# Patient Record
Sex: FEMALE | Race: OTHER | NOT HISPANIC OR LATINO | URBAN - METROPOLITAN AREA
[De-identification: names, ages, dates, MRNs, and addresses within clinical notes are randomized per-mention and may not be internally consistent; named-entity substitution may affect disease eponyms.]

---

## 2022-11-16 ENCOUNTER — OUTPATIENT (OUTPATIENT)
Dept: OUTPATIENT SERVICES | Facility: HOSPITAL | Age: 62
LOS: 1 days | End: 2022-11-16

## 2022-11-16 VITALS
RESPIRATION RATE: 18 BRPM | TEMPERATURE: 98 F | WEIGHT: 186.07 LBS | HEART RATE: 70 BPM | HEIGHT: 66 IN | DIASTOLIC BLOOD PRESSURE: 81 MMHG | OXYGEN SATURATION: 98 % | SYSTOLIC BLOOD PRESSURE: 127 MMHG

## 2022-11-16 DIAGNOSIS — I10 ESSENTIAL (PRIMARY) HYPERTENSION: ICD-10-CM

## 2022-11-16 DIAGNOSIS — E05.90 THYROTOXICOSIS, UNSPECIFIED WITHOUT THYROTOXIC CRISIS OR STORM: ICD-10-CM

## 2022-11-16 DIAGNOSIS — Z01.818 ENCOUNTER FOR OTHER PREPROCEDURAL EXAMINATION: ICD-10-CM

## 2022-11-16 DIAGNOSIS — M17.0 BILATERAL PRIMARY OSTEOARTHRITIS OF KNEE: ICD-10-CM

## 2022-11-16 DIAGNOSIS — R73.03 PREDIABETES: ICD-10-CM

## 2022-11-16 DIAGNOSIS — Z98.890 OTHER SPECIFIED POSTPROCEDURAL STATES: Chronic | ICD-10-CM

## 2022-11-16 DIAGNOSIS — N18.9 CHRONIC KIDNEY DISEASE, UNSPECIFIED: ICD-10-CM

## 2022-11-16 LAB
ALBUMIN SERPL ELPH-MCNC: 3.2 G/DL — LOW (ref 3.5–5)
ALP SERPL-CCNC: 82 U/L — SIGNIFICANT CHANGE UP (ref 40–120)
ALT FLD-CCNC: 23 U/L DA — SIGNIFICANT CHANGE UP (ref 10–60)
ANION GAP SERPL CALC-SCNC: 6 MMOL/L — SIGNIFICANT CHANGE UP (ref 5–17)
APTT BLD: 27.5 SEC — SIGNIFICANT CHANGE UP (ref 27.5–35.5)
AST SERPL-CCNC: 8 U/L — LOW (ref 10–40)
BILIRUB SERPL-MCNC: 0.2 MG/DL — SIGNIFICANT CHANGE UP (ref 0.2–1.2)
BLD GP AB SCN SERPL QL: SIGNIFICANT CHANGE UP
BUN SERPL-MCNC: 36 MG/DL — HIGH (ref 7–18)
CALCIUM SERPL-MCNC: 9.3 MG/DL — SIGNIFICANT CHANGE UP (ref 8.4–10.5)
CHLORIDE SERPL-SCNC: 115 MMOL/L — HIGH (ref 96–108)
CO2 SERPL-SCNC: 24 MMOL/L — SIGNIFICANT CHANGE UP (ref 22–31)
CREAT SERPL-MCNC: 1.46 MG/DL — HIGH (ref 0.5–1.3)
EGFR: 40 ML/MIN/1.73M2 — LOW
GLUCOSE SERPL-MCNC: 132 MG/DL — HIGH (ref 70–99)
HCT VFR BLD CALC: 39.2 % — SIGNIFICANT CHANGE UP (ref 34.5–45)
HGB BLD-MCNC: 12.7 G/DL — SIGNIFICANT CHANGE UP (ref 11.5–15.5)
INR BLD: 0.98 RATIO — SIGNIFICANT CHANGE UP (ref 0.88–1.16)
MCHC RBC-ENTMCNC: 28.9 PG — SIGNIFICANT CHANGE UP (ref 27–34)
MCHC RBC-ENTMCNC: 32.4 GM/DL — SIGNIFICANT CHANGE UP (ref 32–36)
MCV RBC AUTO: 89.3 FL — SIGNIFICANT CHANGE UP (ref 80–100)
MRSA PCR RESULT.: SIGNIFICANT CHANGE UP
NRBC # BLD: 0 /100 WBCS — SIGNIFICANT CHANGE UP (ref 0–0)
PLATELET # BLD AUTO: 250 K/UL — SIGNIFICANT CHANGE UP (ref 150–400)
POTASSIUM SERPL-MCNC: 4.7 MMOL/L — SIGNIFICANT CHANGE UP (ref 3.5–5.3)
POTASSIUM SERPL-SCNC: 4.7 MMOL/L — SIGNIFICANT CHANGE UP (ref 3.5–5.3)
PROT SERPL-MCNC: 7.6 G/DL — SIGNIFICANT CHANGE UP (ref 6–8.3)
PROTHROM AB SERPL-ACNC: 11.6 SEC — SIGNIFICANT CHANGE UP (ref 10.5–13.4)
RBC # BLD: 4.39 M/UL — SIGNIFICANT CHANGE UP (ref 3.8–5.2)
RBC # FLD: 12 % — SIGNIFICANT CHANGE UP (ref 10.3–14.5)
S AUREUS DNA NOSE QL NAA+PROBE: SIGNIFICANT CHANGE UP
SARS-COV-2 RNA SPEC QL NAA+PROBE: SIGNIFICANT CHANGE UP
SODIUM SERPL-SCNC: 145 MMOL/L — SIGNIFICANT CHANGE UP (ref 135–145)
WBC # BLD: 10.11 K/UL — SIGNIFICANT CHANGE UP (ref 3.8–10.5)
WBC # FLD AUTO: 10.11 K/UL — SIGNIFICANT CHANGE UP (ref 3.8–10.5)

## 2022-11-16 RX ORDER — SODIUM CHLORIDE 9 MG/ML
3 INJECTION INTRAMUSCULAR; INTRAVENOUS; SUBCUTANEOUS EVERY 8 HOURS
Refills: 0 | Status: DISCONTINUED | OUTPATIENT
Start: 2022-11-18 | End: 2022-11-21

## 2022-11-16 NOTE — H&P PST ADULT - NSICDXFAMILYHX_GEN_ALL_CORE_FT
FAMILY HISTORY:  Father  Still living? No  Family history of diabetes mellitus, Age at diagnosis: Age Unknown    Mother  Still living? No  Family history of stomach cancer, Age at diagnosis: Age Unknown

## 2022-11-16 NOTE — H&P PST ADULT - ASSESSMENT
This is a 61 yr old Ch[place] female with PMH of HTN, CKD-last EGFR 37 on 10/26/2022, COVID-19 viral infection in April 2020, PSH of re who presents with bilateral primary osteoarthritis of knee. Pt is scheduled for right total knee replacement on 11/18/2022.  YVONNE stop bang score 4. Pt denies history of YVONNE, never did sleep study.  This is a 61 yr old Dominican female with PMH of HTN, prediabetes-last Hemoglobin A1C 6.0 on 10/26/2022, CKD-last EGFR 37 on 10/26/2022, subclinical hyperthyroidism-last TSH 0.28, T4 0.88 on 10/26/2022-not on any medication-PCP to follow-up after surgery given the emergence of the surgery, COVID-19 viral infection in April 2020 who presents with bilateral primary osteoarthritis of knee. Pt is scheduled for right total knee replacement on 11/18/2022.  YVONNE stop bang score 4. Pt denies history of YVONNE, never did sleep study.

## 2022-11-16 NOTE — H&P PST ADULT - FALL HARM RISK - HARM RISK INTERVENTIONS
Assistance with ambulation/Assistance OOB with selected safe patient handling equipment/Communicate Risk of Fall with Harm to all staff/Discuss with provider need for PT consult/Monitor gait and stability/Provide patient with walking aids - walker, cane, crutches/Reinforce activity limits and safety measures with patient and family/Sit up slowly, dangle for a short time, stand at bedside before walking/Tailored Fall Risk Interventions/Use of alarms - bed, chair and/or voice tab/Visual Cue: Yellow wristband and red socks/Bed in lowest position, wheels locked, appropriate side rails in place/Call bell, personal items and telephone in reach/Instruct patient to call for assistance before getting out of bed or chair/Non-slip footwear when patient is out of bed/[place] to call system/Physically safe environment - no spills, clutter or unnecessary equipment/Purposeful Proactive Rounding/Room/bathroom lighting operational, light cord in reach

## 2022-11-16 NOTE — H&P PST ADULT - PROBLEM SELECTOR PLAN 5
Last TSH 0.28, T4 0.88 on 10/26/2022-not on any medication.   As per PCP Dr Serina Lala she will follow-up after surgery given the emergence of the surgery.

## 2022-11-16 NOTE — H&P PST ADULT - NSICDXPASTSURGICALHX_GEN_ALL_CORE_FT
PAST SURGICAL HISTORY:  History of nephrolithotomy with removal of calculi bilateral in 1994 and 1995

## 2022-11-16 NOTE — CHART NOTE - NSCHARTNOTEFT_GEN_A_CORE
PRE-TJR SOCIAL/FUNCTIONAL SCREENING Via: In Person Meet  after PST on 11/16/22 with son and int ID [id number]                                                                                                                                  Preferred Language: Ginny   Patient Telephone #:[phone]  EMAIL ADDRESS:[email or web address]        Pt stated Goal for Surgery : " To alleviate pain in my knee."     Sx Date:  11/18/2022                                                                                       Surgery Type:  Right  Knee Replacement     Surgeon Dr Carina MENDEZ Status: Pt. is Fully Vaccinated  // COVID test scheduled     SOCIAL HISTORY:     Live With children  in a    Apartment     Stairs Required to Access (Street to Front Door) Residence:   None     Once Inside Pt Residence:   To Access a Bathroom:      No Stairs Required     To Access a Bedroom Where Pt. Can Sleep:  No Stairs Required       Pt. Currently Has Formal Home Health Services:  No      MOBILITY HISTORY:   Baseline Ambulation- Pt is Independent in ambulation     Pt. Owns Any Other Medical Equipment?  No   MEDICAL HISTORY:  Pt has any History of Back Surgery:   No    Pt has any Allergies:  No    Pt denies Hx of Sleep Apnea   Pt denies use of C/BIPAP     Pt. Pharmacy Information:  Name:  [phone]                 Address:  [address] [facility] [address]        Telephone #:     Pt. will Require Transportation to Home Upon Discharge: No - son will  the patient after surgery     For Post-Acute Care:  Pt. prefers  [facility] for post acute needs   Pt was provided with pre- op education book "What to do before and after knee  replacement " and referred to it during Pre-op education. All questions were answered , pt. has my phone number for follow up as needed.

## 2022-11-16 NOTE — H&P PST ADULT - NSICDXPASTMEDICALHX_GEN_ALL_CORE_FT
PAST MEDICAL HISTORY:  HTN (hypertension)     Prediabetes     Primary osteoarthritis of both knees      PAST MEDICAL HISTORY:  CKD (chronic kidney disease)     HTN (hypertension)     Prediabetes     Primary osteoarthritis of both knees      PAST MEDICAL HISTORY:  CKD (chronic kidney disease)     COVID-19 virus infection 4/2021    HTN (hypertension)     Prediabetes     Primary osteoarthritis of both knees     Subclinical hyperthyroidism

## 2022-11-16 NOTE — H&P PST ADULT - ATTENDING COMMENTS
Plan for right total knee  Risks discussed: infection, nerve injury, blood clot, etc...  She signed the consent. 7:45 am:  Plan for right total knee  Risks discussed: infection, nerve injury, blood clot, etc...  She signed the consent.    9:11 am:  Pt had head ach before entering the operating room.  Not able to control the BP well enough for surgery.  Surgery cancelled.  Will get medical consult for managing her BP.  Will reschedule surgery when BP is stabilized.

## 2022-11-16 NOTE — H&P PST ADULT - PROBLEM SELECTOR PLAN 4
Last Hemoglobin A1C 6.0 on 10/26/2022.  Perioperative glucose monitoring and coverage as needed.   Follow-up with PCP for diabetic management.

## 2022-11-16 NOTE — H&P PST ADULT - PROBLEM SELECTOR PLAN 3
h/o CKD-last EGFR 37 on 10/26/2022.  Pt instructed to avoid NSAIDs 1 week before surgery.  Advised to take Tylenol as needed for pain. Stated understanding of instructions given. h/o CKD-last EGFR 37 on 10/26/2022.  Nephrotoxic agents to be avoided perioperatively.  Pt instructed to avoid NSAIDs 1 week before surgery.  Advised to take Tylenol as needed for pain. Stated understanding of instructions given.

## 2022-11-16 NOTE — H&P PST ADULT - HISTORY OF PRESENT ILLNESS
This is a 61 yr old Mosotho female with PMH of HTN, CKD-last EGFR 37 on 10/26/2022, presents with c/o knee pain due to osteoarthritis.Pt reports worsening of pain with prolonged ambulation and standing.Recent X-Ray revealed loss of cartilage. Pt is scheduled for right total knee replacement on 11/18/2022. This is a 61 yr old [place] female with PMH of HTN, prediabetes-last Hemoglobin A1C 6.0 on 10/26/2022, CKD-last EGFR 37 on 10/26/2022, COVID-19 viral infection in April 2020 who presents with c/o bilateral knee pain due to osteoarthritis-right worse than left. Pt reports worsening of pain with prolonged ambulation. Pain has subsided in left knee after receiving steroid injections. Pt is scheduled for right total knee replacement on 11/18/2022. This is a 61 yr old Tajik female with PMH of HTN, prediabetes-last Hemoglobin A1C 6.0 on 10/26/2022, CKD-last EGFR 37 on 10/26/2022, subclinical hyperthyroidism-last TSH 0.28, T4 0.88 on 10/26/2022-not on any medication-PCP to follow-up after surgery given the emergence of the surgery, COVID-19 viral infection in April 2020 who presents with c/o bilateral knee pain due to osteoarthritis-right worse than left. Pt reports worsening of pain with prolonged ambulation. Pain has subsided in left knee after receiving steroid injections. Pt is scheduled for right total knee replacement on 11/18/2022.

## 2022-11-16 NOTE — H&P PST ADULT - PROBLEM SELECTOR PLAN 2
Instructed to continue Lisinopril and take it with sips of water on day of surgery.    Follow-up with PCP postop for management.

## 2022-11-16 NOTE — H&P PST ADULT - PROBLEM SELECTOR PLAN 1
Right total knee replacement on 11/18/2022.  Optimized for surgery by PCP.  YVONNE stop bang score 4. Pt denies history of YVONNE, never did sleep study.   COVID-19 PCR test performed in PST today.  Preoperative instructions discussed with pt via Yoruba speaking son and son and given to pt.   Instructed pt that she will need someone to escort her home after surgery, to notify security when she arrives in the lobby of the hospital that she is here for surgery, not to eat or drink anything after midnight the night before the surgery, to avoid NSAIDs such as Ibuprofen, Motrin, Aleve, Advil, Naproxen before surgery, to take Tylenol if needed for pain, to report if she has been exposed to any one with any contagious diseases including Covid-19 or if she is exhibiting any symptoms of COVID-19. Instructed about use of Chlorhexidine 4% soap for 3 days before surgery including the morning of the surgery to prevent infection. Verbalized understanding of instructions given.

## 2022-11-17 ENCOUNTER — OFFICE (OUTPATIENT)
Dept: URBAN - METROPOLITAN AREA CLINIC 90 | Facility: CLINIC | Age: 62
Setting detail: OPHTHALMOLOGY
End: 2022-11-17
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
## 2022-11-17 PROCEDURE — NO SHOW FE NO SHOW FEE: Performed by: OPHTHALMOLOGY

## 2022-11-18 ENCOUNTER — INPATIENT (INPATIENT)
Facility: HOSPITAL | Age: 62
LOS: 2 days | Discharge: HOME | DRG: 554 | End: 2022-11-21
Attending: INTERNAL MEDICINE | Admitting: INTERNAL MEDICINE
Payer: MEDICAID

## 2022-11-18 VITALS
WEIGHT: 186.07 LBS | RESPIRATION RATE: 16 BRPM | OXYGEN SATURATION: 97 % | SYSTOLIC BLOOD PRESSURE: 169 MMHG | HEIGHT: 66 IN | HEART RATE: 84 BPM | TEMPERATURE: 98 F | DIASTOLIC BLOOD PRESSURE: 102 MMHG

## 2022-11-18 DIAGNOSIS — I63.9 CEREBRAL INFARCTION, UNSPECIFIED: ICD-10-CM

## 2022-11-18 DIAGNOSIS — M17.0 BILATERAL PRIMARY OSTEOARTHRITIS OF KNEE: ICD-10-CM

## 2022-11-18 DIAGNOSIS — N17.9 ACUTE KIDNEY FAILURE, UNSPECIFIED: ICD-10-CM

## 2022-11-18 DIAGNOSIS — I16.0 HYPERTENSIVE URGENCY: ICD-10-CM

## 2022-11-18 DIAGNOSIS — Z98.890 OTHER SPECIFIED POSTPROCEDURAL STATES: Chronic | ICD-10-CM

## 2022-11-18 DIAGNOSIS — I16.1 HYPERTENSIVE EMERGENCY: ICD-10-CM

## 2022-11-18 PROBLEM — E05.90 THYROTOXICOSIS, UNSPECIFIED WITHOUT THYROTOXIC CRISIS OR STORM: Chronic | Status: ACTIVE | Noted: 2022-11-16

## 2022-11-18 PROBLEM — N18.9 CHRONIC KIDNEY DISEASE, UNSPECIFIED: Chronic | Status: ACTIVE | Noted: 2022-11-16

## 2022-11-18 PROBLEM — I10 ESSENTIAL (PRIMARY) HYPERTENSION: Chronic | Status: ACTIVE | Noted: 2022-11-16

## 2022-11-18 PROBLEM — U07.1 COVID-19: Chronic | Status: ACTIVE | Noted: 2022-11-16

## 2022-11-18 PROBLEM — R73.03 PREDIABETES: Chronic | Status: ACTIVE | Noted: 2022-11-16

## 2022-11-18 LAB
ALBUMIN SERPL ELPH-MCNC: 3.1 G/DL — LOW (ref 3.5–5)
ALP SERPL-CCNC: 73 U/L — SIGNIFICANT CHANGE UP (ref 40–120)
ALT FLD-CCNC: 20 U/L DA — SIGNIFICANT CHANGE UP (ref 10–60)
ANION GAP SERPL CALC-SCNC: 6 MMOL/L — SIGNIFICANT CHANGE UP (ref 5–17)
APPEARANCE UR: CLEAR — SIGNIFICANT CHANGE UP
APTT BLD: 28.9 SEC — SIGNIFICANT CHANGE UP (ref 27.5–35.5)
AST SERPL-CCNC: 11 U/L — SIGNIFICANT CHANGE UP (ref 10–40)
BACTERIA # UR AUTO: ABNORMAL /HPF
BASOPHILS # BLD AUTO: 0.05 K/UL — SIGNIFICANT CHANGE UP (ref 0–0.2)
BASOPHILS NFR BLD AUTO: 0.6 % — SIGNIFICANT CHANGE UP (ref 0–2)
BILIRUB SERPL-MCNC: 0.3 MG/DL — SIGNIFICANT CHANGE UP (ref 0.2–1.2)
BILIRUB UR-MCNC: NEGATIVE — SIGNIFICANT CHANGE UP
BLD GP AB SCN SERPL QL: SIGNIFICANT CHANGE UP
BUN SERPL-MCNC: 22 MG/DL — HIGH (ref 7–18)
CALCIUM SERPL-MCNC: 8.4 MG/DL — SIGNIFICANT CHANGE UP (ref 8.4–10.5)
CHLORIDE SERPL-SCNC: 110 MMOL/L — HIGH (ref 96–108)
CK MB BLD-MCNC: <5.6 % — HIGH (ref 0–3.5)
CK MB CFR SERPL CALC: <1 NG/ML — SIGNIFICANT CHANGE UP (ref 0–3.6)
CK SERPL-CCNC: 18 U/L — LOW (ref 21–215)
CO2 SERPL-SCNC: 27 MMOL/L — SIGNIFICANT CHANGE UP (ref 22–31)
COLOR SPEC: YELLOW — SIGNIFICANT CHANGE UP
CREAT ?TM UR-MCNC: 28 MG/DL — SIGNIFICANT CHANGE UP
CREAT SERPL-MCNC: 1.13 MG/DL — SIGNIFICANT CHANGE UP (ref 0.5–1.3)
DIFF PNL FLD: NEGATIVE — SIGNIFICANT CHANGE UP
EGFR: 55 ML/MIN/1.73M2 — LOW
EOSINOPHIL # BLD AUTO: 0.07 K/UL — SIGNIFICANT CHANGE UP (ref 0–0.5)
EOSINOPHIL NFR BLD AUTO: 0.8 % — SIGNIFICANT CHANGE UP (ref 0–6)
EPI CELLS # UR: SIGNIFICANT CHANGE UP /HPF
GLUCOSE BLDC GLUCOMTR-MCNC: 93 MG/DL — SIGNIFICANT CHANGE UP (ref 70–99)
GLUCOSE SERPL-MCNC: 126 MG/DL — HIGH (ref 70–99)
GLUCOSE UR QL: NEGATIVE — SIGNIFICANT CHANGE UP
HCT VFR BLD CALC: 37.7 % — SIGNIFICANT CHANGE UP (ref 34.5–45)
HGB BLD-MCNC: 12.1 G/DL — SIGNIFICANT CHANGE UP (ref 11.5–15.5)
IMM GRANULOCYTES NFR BLD AUTO: 0.4 % — SIGNIFICANT CHANGE UP (ref 0–0.9)
INR BLD: 1 RATIO — SIGNIFICANT CHANGE UP (ref 0.88–1.16)
KETONES UR-MCNC: NEGATIVE — SIGNIFICANT CHANGE UP
LEUKOCYTE ESTERASE UR-ACNC: NEGATIVE — SIGNIFICANT CHANGE UP
LYMPHOCYTES # BLD AUTO: 2.22 K/UL — SIGNIFICANT CHANGE UP (ref 1–3.3)
LYMPHOCYTES # BLD AUTO: 26.7 % — SIGNIFICANT CHANGE UP (ref 13–44)
MCHC RBC-ENTMCNC: 28.3 PG — SIGNIFICANT CHANGE UP (ref 27–34)
MCHC RBC-ENTMCNC: 32.1 GM/DL — SIGNIFICANT CHANGE UP (ref 32–36)
MCV RBC AUTO: 88.1 FL — SIGNIFICANT CHANGE UP (ref 80–100)
MONOCYTES # BLD AUTO: 0.47 K/UL — SIGNIFICANT CHANGE UP (ref 0–0.9)
MONOCYTES NFR BLD AUTO: 5.7 % — SIGNIFICANT CHANGE UP (ref 2–14)
NEUTROPHILS # BLD AUTO: 5.46 K/UL — SIGNIFICANT CHANGE UP (ref 1.8–7.4)
NEUTROPHILS NFR BLD AUTO: 65.8 % — SIGNIFICANT CHANGE UP (ref 43–77)
NITRITE UR-MCNC: NEGATIVE — SIGNIFICANT CHANGE UP
NRBC # BLD: 0 /100 WBCS — SIGNIFICANT CHANGE UP (ref 0–0)
OSMOLALITY UR: 468 MOS/KG — SIGNIFICANT CHANGE UP (ref 50–1200)
PH UR: 6 — SIGNIFICANT CHANGE UP (ref 5–8)
PLATELET # BLD AUTO: 232 K/UL — SIGNIFICANT CHANGE UP (ref 150–400)
POTASSIUM SERPL-MCNC: 3.8 MMOL/L — SIGNIFICANT CHANGE UP (ref 3.5–5.3)
POTASSIUM SERPL-SCNC: 3.8 MMOL/L — SIGNIFICANT CHANGE UP (ref 3.5–5.3)
PROT ?TM UR-MCNC: 11 MG/DL — SIGNIFICANT CHANGE UP (ref 0–12)
PROT SERPL-MCNC: 6.6 G/DL — SIGNIFICANT CHANGE UP (ref 6–8.3)
PROT UR-MCNC: 15
PROTHROM AB SERPL-ACNC: 11.9 SEC — SIGNIFICANT CHANGE UP (ref 10.5–13.4)
RBC # BLD: 4.28 M/UL — SIGNIFICANT CHANGE UP (ref 3.8–5.2)
RBC # FLD: 12.1 % — SIGNIFICANT CHANGE UP (ref 10.3–14.5)
RBC CASTS # UR COMP ASSIST: SIGNIFICANT CHANGE UP /HPF (ref 0–2)
SODIUM SERPL-SCNC: 143 MMOL/L — SIGNIFICANT CHANGE UP (ref 135–145)
SODIUM UR-SCNC: 134 MMOL/L — SIGNIFICANT CHANGE UP
SP GR SPEC: 1.01 — SIGNIFICANT CHANGE UP (ref 1.01–1.02)
TROPONIN I, HIGH SENSITIVITY RESULT: 6.7 NG/L — SIGNIFICANT CHANGE UP
UROBILINOGEN FLD QL: NEGATIVE — SIGNIFICANT CHANGE UP
WBC # BLD: 8.3 K/UL — SIGNIFICANT CHANGE UP (ref 3.8–10.5)
WBC # FLD AUTO: 8.3 K/UL — SIGNIFICANT CHANGE UP (ref 3.8–10.5)
WBC UR QL: SIGNIFICANT CHANGE UP /HPF (ref 0–5)

## 2022-11-18 PROCEDURE — 99223 1ST HOSP IP/OBS HIGH 75: CPT

## 2022-11-18 PROCEDURE — 0042T: CPT

## 2022-11-18 PROCEDURE — 99223 1ST HOSP IP/OBS HIGH 75: CPT | Mod: GC

## 2022-11-18 PROCEDURE — 70496 CT ANGIOGRAPHY HEAD: CPT | Mod: 26

## 2022-11-18 PROCEDURE — 70498 CT ANGIOGRAPHY NECK: CPT | Mod: 26

## 2022-11-18 RX ORDER — ONDANSETRON 8 MG/1
4 TABLET, FILM COATED ORAL EVERY 8 HOURS
Refills: 0 | Status: DISCONTINUED | OUTPATIENT
Start: 2022-11-18 | End: 2022-11-21

## 2022-11-18 RX ORDER — LISINOPRIL 2.5 MG/1
1 TABLET ORAL
Qty: 0 | Refills: 0 | DISCHARGE

## 2022-11-18 RX ORDER — MECLIZINE HCL 12.5 MG
1 TABLET ORAL
Qty: 0 | Refills: 0 | DISCHARGE

## 2022-11-18 RX ORDER — ACETAMINOPHEN 500 MG
650 TABLET ORAL EVERY 6 HOURS
Refills: 0 | Status: DISCONTINUED | OUTPATIENT
Start: 2022-11-18 | End: 2022-11-21

## 2022-11-18 RX ORDER — ALENDRONATE SODIUM 70 MG/1
1 TABLET ORAL
Qty: 0 | Refills: 0 | DISCHARGE

## 2022-11-18 RX ORDER — CHLORHEXIDINE GLUCONATE 213 G/1000ML
1 SOLUTION TOPICAL ONCE
Refills: 0 | Status: DISCONTINUED | OUTPATIENT
Start: 2022-11-18 | End: 2022-11-18

## 2022-11-18 RX ORDER — ASPIRIN/CALCIUM CARB/MAGNESIUM 324 MG
81 TABLET ORAL DAILY
Refills: 0 | Status: DISCONTINUED | OUTPATIENT
Start: 2022-11-18 | End: 2022-11-21

## 2022-11-18 RX ORDER — CLOPIDOGREL BISULFATE 75 MG/1
75 TABLET, FILM COATED ORAL DAILY
Refills: 0 | Status: DISCONTINUED | OUTPATIENT
Start: 2022-11-18 | End: 2022-11-18

## 2022-11-18 RX ORDER — CLOPIDOGREL BISULFATE 75 MG/1
75 TABLET, FILM COATED ORAL DAILY
Refills: 0 | Status: DISCONTINUED | OUTPATIENT
Start: 2022-11-18 | End: 2022-11-20

## 2022-11-18 RX ORDER — FENTANYL CITRATE 50 UG/ML
25 INJECTION INTRAVENOUS
Refills: 0 | Status: DISCONTINUED | OUTPATIENT
Start: 2022-11-18 | End: 2022-11-18

## 2022-11-18 RX ORDER — ATORVASTATIN CALCIUM 80 MG/1
80 TABLET, FILM COATED ORAL AT BEDTIME
Refills: 0 | Status: DISCONTINUED | OUTPATIENT
Start: 2022-11-18 | End: 2022-11-21

## 2022-11-18 RX ORDER — FENTANYL CITRATE 50 UG/ML
50 INJECTION INTRAVENOUS ONCE
Refills: 0 | Status: DISCONTINUED | OUTPATIENT
Start: 2022-11-18 | End: 2022-11-18

## 2022-11-18 RX ORDER — TRAMADOL HYDROCHLORIDE 50 MG/1
50 TABLET ORAL ONCE
Refills: 0 | Status: COMPLETED | OUTPATIENT
Start: 2022-11-18 | End: 2022-11-18

## 2022-11-18 RX ORDER — IBUPROFEN 200 MG
1 TABLET ORAL
Qty: 0 | Refills: 0 | DISCHARGE

## 2022-11-18 RX ADMIN — Medication 650 MILLIGRAM(S): at 20:44

## 2022-11-18 RX ADMIN — Medication 650 MILLIGRAM(S): at 21:45

## 2022-11-18 RX ADMIN — SODIUM CHLORIDE 3 MILLILITER(S): 9 INJECTION INTRAMUSCULAR; INTRAVENOUS; SUBCUTANEOUS at 15:52

## 2022-11-18 RX ADMIN — SODIUM CHLORIDE 3 MILLILITER(S): 9 INJECTION INTRAMUSCULAR; INTRAVENOUS; SUBCUTANEOUS at 21:45

## 2022-11-18 RX ADMIN — Medication 650 MILLIGRAM(S): at 11:45

## 2022-11-18 RX ADMIN — Medication 650 MILLIGRAM(S): at 11:30

## 2022-11-18 RX ADMIN — ONDANSETRON 4 MILLIGRAM(S): 8 TABLET, FILM COATED ORAL at 14:26

## 2022-11-18 NOTE — CONSULT NOTE ADULT - SUBJECTIVE AND OBJECTIVE BOX
PATIENT SEEN AND EXAMINED ON :- 11/18/22  DATE OF SERVICE:   11/18/22          Interim events noted,Labs ,Radiological studies and Cardiology tests reviewed .       HOSPITAL COURSE: HPI:  Patient is a 62 y/o F, Telugu-speaking F from [place] w/ PMHx of HTN, OA. Patient is a poor historian. Collateral Hx was attempted from her son, Alex Newberry ([phone]) but he is not sure about patient's pharmacy and PCP. Patient is complaining of She was scheduled for elective R total knee replacement on 11/18/22. She did not take her medications on the day of admission. Pre-Op she was complaining of severe headache at the back of her head, nausea and dizziness. Her admission BP was 169/102 but went up to 215/111. The surgery was cancelled and patient was transferred to PACU for close monitoring. Medicine was consulted and patient was subsequently admitted. She continued to have above symptoms. Her BP was noted to be -160s. She received a dose of IV Zofran and Tylenol. She passed her dysphagia screen. Upon PE, patient was noted to be AAOx3, had some confusion and left lower facial droop w/ associated numbness. She also had weakness on her LUE (4/5). CODE STROKE was called. NIHSS was 2. Neurology (Dr. Thompson) was consulted. CTA H&N showed no acute infarct nor bleed. Patient was subsequently transferred to  for Telemetry monitoring.    GOC: FULL CODE (18 Nov 2022 13:33)      INTERIM EVENTS:Patient seen at bedside ,interim events noted.      PMH -reviewed admission note, no change since admission  HEART FAILURE: Acute[ ]Chronic[ ] Systolic[ ] Diastolic[ ] Combined Systolic and Diastolic[ ]  CAD[ ] CABG[ ] PCI[ ]  DEVICES[ ] PPM[ ] ICD[ ] ILR[ ]  ATRIAL FIBRILLATION[ ] Paroxysmal[ ] Permanent[ ] CHADS2-[  ]  CARLOS[ ] CKD1[ ] CKD2[ ] CKD3[ ] CKD4[ ] ESRD[ ]  COPD[ ] HTN[ ]   DM[ ] Type1[ ] Type 2[ ]   CVA[ ] Paresis[ ]    AMBULATION: Assisted[ ] Cane/walker[ ] Independent[ ]    MEDICATIONS  (STANDING):  aspirin  chewable 81 milliGRAM(s) Oral daily  atorvastatin 80 milliGRAM(s) Oral at bedtime  clopidogrel Tablet 75 milliGRAM(s) Oral daily  sodium chloride 0.9% lock flush 3 milliLiter(s) IV Push every 8 hours    MEDICATIONS  (PRN):  acetaminophen     Tablet .. 650 milliGRAM(s) Oral every 6 hours PRN Mild Pain (1 - 3)  ondansetron Injectable 4 milliGRAM(s) IV Push every 8 hours PRN Nausea and/or Vomiting            REVIEW OF SYSTEMS:  Constitutional: [ ] fever, [ ]weight loss,  [ ]fatigue [ ]weight gain  Eyes: [ ] visual changes  Respiratory: [ ]shortness of breath;  [ ] cough, [ ]wheezing, [ ]chills, [ ]hemoptysis  Cardiovascular: [ ] chest pain, [ ]palpitations, [ ]dizziness,  [ ]leg swelling[ ]orthopnea[ ]PND  Gastrointestinal: [ ] abdominal pain, [ ]nausea, [ ]vomiting,  [ ]diarrhea [ ]Constipation [ ]Melena  Genitourinary: [ ] dysuria, [ ] hematuria [ ]Russell  Neurologic: [ ] headaches [ ] tremors[ ]weakness [ ]Paralysis Right[ ] Left[ ]  Skin: [ ] itching, [ ]burning, [ ] rashes  Endocrine: [ ] heat or cold intolerance  Musculoskeletal: [ ] joint pain or swelling; [ ] muscle, back, or extremity pain  Psychiatric: [ ] depression, [ ]anxiety, [ ]mood swings, or [ ]difficulty sleeping  Hematologic: [ ] easy bruising, [ ] bleeding gums    [ ] All remaining systems negative except as per above.   [ ]Unable to obtain.  [x] No change in ROS since admission      Vital Signs Last 24 Hrs  T(C): 36.4 (18 Nov 2022 19:48), Max: 36.7 (18 Nov 2022 07:40)  T(F): 97.6 (18 Nov 2022 19:48), Max: 98.1 (18 Nov 2022 07:40)  HR: 83 (18 Nov 2022 19:48) (69 - 87)  BP: 167/104 (18 Nov 2022 19:48) (128/77 - 169/102)  BP(mean): 111 (18 Nov 2022 12:50) (88 - 118)  RR: 18 (18 Nov 2022 19:48) (15 - 22)  SpO2: 96% (18 Nov 2022 19:48) (95% - 100%)    Parameters below as of 18 Nov 2022 19:48  Patient On (Oxygen Delivery Method): room air      I&O's Summary      PHYSICAL EXAM:  General: No acute distress BMI-  HEENT: EOMI, PERRL  Neck: Supple, [ ] JVD  Lungs: Equal air entry bilaterally; [ ] rales [ ] wheezing [ ] rhonchi  Heart: Regular rate and rhythm; [x ] murmur   2/6 [ x] systolic [ ] diastolic [ ] radiation[ ] rubs [ ]  gallops  Abdomen: Nontender, bowel sounds present  Extremities: No clubbing, cyanosis, [ ] edema [ ]Pulses  equal and intact  Nervous system:  Alert & Oriented X3, no focal deficits  Psychiatric: Normal affect  Skin: No rashes or lesions    LABS:  11-18    143  |  110<H>  |  22<H>  ----------------------------<  126<H>  3.8   |  27  |  1.13    Ca    8.4      18 Nov 2022 14:55    TPro  6.6  /  Alb  3.1<L>  /  TBili  0.3  /  DBili  x   /  AST  11  /  ALT  20  /  AlkPhos  73  11-18    Creatinine Trend: 1.13<--, 1.46<--                        12.1   8.30  )-----------( 232      ( 18 Nov 2022 14:55 )             37.7     PT/INR - ( 18 Nov 2022 14:55 )   PT: 11.9 sec;   INR: 1.00 ratio         PTT - ( 18 Nov 2022 14:55 )  PTT:28.9 sec

## 2022-11-18 NOTE — H&P ADULT - ASSESSMENT
Patient is a 62 y/o F, Macedonian-speaking F from [place] w/ PMHx of HTN, OA. Admitted for HTN emergency (r/o CVA)

## 2022-11-18 NOTE — H&P ADULT - PROBLEM SELECTOR PLAN 3
Pt w/ SCr 1.46 on admission  unknown baseline SCr  F/U Urine Lytes, calculate FeNa  IVF for now, follow BMP daily

## 2022-11-18 NOTE — H&P ADULT - NSHPPHYSICALEXAM_GEN_ALL_CORE
Vital Signs  - Temp - 98.1 F (36.7 C)  - HR - 84  - BP - 169/102  - RR - 16  - SO2 - 97%    PHYSICAL EXAM:  GENERAL: NAD, speaks in full sentences, no signs of respiratory distress  HEAD:  Atraumatic, Normocephalic  EYES: EOMI, PERRLA, conjunctiva and sclera clear  NECK: Supple, No JVD  CHEST/LUNG: Clear to auscultation bilaterally; No wheeze; No crackles; No accessory muscles used  HEART: Regular rate and rhythm; No murmurs;   ABDOMEN: Soft, Nontender, Nondistended; Bowel sounds present; No guarding  EXTREMITIES:  2+ Peripheral Pulses, No cyanosis or edema  PSYCH: AAOx3    NERVOUS SYSTEM:  Alert & Oriented X3,  Cranial nerves:  CN II: Visual fields are full to confrontation. Pupils are 3-4 mm and briskly reactive to light.   CN III, IV, VI: intact EOM  CN V: dec. sensation, Left lower face  CN VII: (+) left lower facial droop  CN VIII: Hearing is normal  CN IX, X: Palate elevates symmetrically. Phonation is normal.  CN XI: Head turning and shoulder shrug are intact  CN XII: Tongue is midline with normal movements and no atrophy.  Motor: 4/5 LUE; 5/5 strength on rest of extremities  Sensory: no loss of sensation on rest of extremities  Meningeal Signs: Negative for Brudzinski and Kernig signs    SKIN: No rashes or lesions

## 2022-11-18 NOTE — H&P ADULT - PROBLEM SELECTOR PLAN 1
p/w elevated BP, headache, dizziness, nausea  code stroke in PACU with NIHSS 2  ASA qd, high intensity statin qhs, plavix x21 days if true stroke  CTA H/N wa sneg  Tele monitoring  F/U Echo with bubble study  Pt not rTPA candidate due to: unknown time of onset  Monitor BP - allow permissive htn x24hr from last known normal  PT consult  Dysphagia screen: passed  Neuro checks q4  Neuro Consulted: Dr. Thompson p/w elevated BP, headache, dizziness, nausea  code stroke in PACU with NIHSS 2  ASA qd, high intensity statin qhs, plavix x21 days if true stroke  CTA H/N was neg  Tele monitoring  F/U Echo with bubble study  Pt not rTPA candidate due to: unknown time of onset  Monitor BP - allow permissive htn x24hr from last known normal  PT consult  Dysphagia screen: passed  Neuro checks q4  Neuro Consulted: Dr. Thompson p/w elevated BP, headache, dizziness, nausea  code stroke in PACU with NIHSS 2  ASA qd, high intensity statin qhs, plavix x21 days if true stroke  CTA H/N was neg  Tele monitoring  F/U Echo with bubble study  Pt not rTPA candidate due to: unknown time of onset  Monitor BP - allow permissive htn x24hr from last known normal  PT consult  Dysphagia screen: passed  Neuro checks q4  Neuro Consulted: Dr. Thompson  Cardio Consulted: Dr. Jaquez

## 2022-11-18 NOTE — CHART NOTE - NSCHARTNOTEFT_GEN_A_CORE
Patient was scheduled for an elective right total knee replacement this morning.  Patient did not take her morning blood pressure medication.  When patient was brought to the OR, her blood pressure was noted to be 215/111 with complaints of headache and dizziness.  Surgery was cancelled by Anesthesia, Dr. Dean and patient was brought to PACU because she was administered 2mg of versed.  EKG was ordered in PACU  Medicine was consulted and it was recommended to admit patient to telemetry for further evaluation/monitoring    ICU Vital Signs Last 24 Hrs  T(C): 36.7 (18 Nov 2022 08:50), Max: 36.7 (18 Nov 2022 07:40)  T(F): 98.1 (18 Nov 2022 08:50), Max: 98.1 (18 Nov 2022 07:40)  HR: 75 (18 Nov 2022 10:20) (69 - 87)  BP: 138/75 (18 Nov 2022 10:20) (128/77 - 169/102)  BP(mean): 92 (18 Nov 2022 10:20) (88 - 97)  ABP: --  ABP(mean): --  RR: 22 (18 Nov 2022 10:20) (15 - 22)  SpO2: 95% (18 Nov 2022 10:20) (95% - 100%)    O2 Parameters below as of 18 Nov 2022 08:50  Patient On (Oxygen Delivery Method): room air            Plan: 60 y/o F with hypertensive urgency  > Admit to medicine (tele) Dr. Crowe  > Patient will need to follow up with PCP upon discharge when medically cleared  > Patient to reschedule right total knee replacement with Dr. Lim when medically cleared by PCP  > Updated and discussed plan with patients jerrell Conklin over the phone  > Case d/w Dr. Lim

## 2022-11-18 NOTE — ASU PATIENT PROFILE, ADULT - FALL HARM RISK - HARM RISK INTERVENTIONS
Assistance with ambulation/Assistance OOB with selected safe patient handling equipment/Communicate Risk of Fall with Harm to all staff/Discuss with provider need for PT consult/Monitor gait and stability/Provide patient with walking aids - walker, cane, crutches/Reinforce activity limits and safety measures with patient and family/Sit up slowly, dangle for a short time, stand at bedside before walking/Tailored Fall Risk Interventions/Use of alarms - bed, chair and/or voice tab/Visual Cue: Yellow wristband and red socks/Bed in lowest position, wheels locked, appropriate side rails in place/Call bell, personal items and telephone in reach/Instruct patient to call for assistance before getting out of bed or chair/Non-slip footwear when patient is out of bed/[place] to call system/Physically safe environment - no spills, clutter or unnecessary equipment/Purposeful Proactive Rounding/Room/bathroom lighting operational, light cord in reach

## 2022-11-18 NOTE — H&P ADULT - ATTENDING COMMENTS
Patient seen and examined in PACU with CODE STROKE TEAM    62 y/o F, Albanian-speaking F from [place] w/ PMHx of HTN, OA. Patient is a poor historian. Collateral Hx was attempted from her son, Alex Newberry ([phone]) but he is not sure about patient's pharmacy and PCP. Patient is complaining of She was scheduled for elective R total knee replacement on 11/18/22. She did not take her medications on the day of admission. Pre-Op she was complaining of severe headache at the back of her head, nausea and dizziness. Her admission BP was 169/102 but went up to 215/111. The surgery was cancelled and patient was transferred to PACU for close monitoring. Medicine was consulted and patient was subsequently admitted. She continued to have above symptoms.     Vital Signs Last 24 Hrs  T(C): 36.6 (18 Nov 2022 13:46), Max: 36.7 (18 Nov 2022 07:40)  T(F): 97.9 (18 Nov 2022 13:46), Max: 98.1 (18 Nov 2022 07:40)  HR: 78 (18 Nov 2022 13:46) (69 - 87)  BP: 165/92 (18 Nov 2022 13:46) (128/77 - 169/102)  BP(mean): 105 (18 Nov 2022 11:50) (88 - 118)  RR: 18 (18 Nov 2022 13:46) (15 - 22)  SpO2: 98% (18 Nov 2022 13:46) (95% - 100%) room air    P/E:  Psych: AAO x3  Neuro: No gross focal deficits; Power and sensation intact; mild left arm weakness and slight faciaol droop (old vs new)  CVS: S1S2 present, regular, no edema  Resp: BLAE+, No wheeze or Rhonchi  GI: Soft, BS+, Non tender, non distended  Extr: No  calf tenderness B/L Lower extremities  Skin: Warm and moist without any rashes    Labs: Patient seen and examined in PACU with CODE STROKE TEAM    60 y/o Female, Croatian-speaking F from [place] w/ PMHx of HTN, OA. Patient is a poor historian. PGY2 obtained Collateral Hx was attempted from her son, Alex Newberry ([phone]) but he is not sure about patient's pharmacy and PCP. Patient was scheduled for elective Right total knee replacement on 11/18/22. She did not take her medications on the day of admission as she she was NPO after midnight. Pre-Op she was complaining of severe headache at the back of her head, nausea and dizziness. Her admission BP was 169/102 but went up to 215/111. The surgery was cancelled and patient was transferred to PACU for close monitoring. Medicine was consulted and patient was subsequently admitted.  On PGY2 assessment patient was noted ot have a facial droop (unclear if old or new).     Patient was AAOx 3, able to answer all questions appropriately. minor weakness noted on Left arm. No other neurological deficits noted.     ROS/FH/SH: as above    Vital Signs Last 24 Hrs  T(C): 36.6 (18 Nov 2022 13:46), Max: 36.7 (18 Nov 2022 07:40)  T(F): 97.9 (18 Nov 2022 13:46), Max: 98.1 (18 Nov 2022 07:40)  HR: 78 (18 Nov 2022 13:46) (69 - 87)  BP: 165/92 (18 Nov 2022 13:46) (128/77 - 169/102)  BP(mean): 105 (18 Nov 2022 11:50) (88 - 118)  RR: 18 (18 Nov 2022 13:46) (15 - 22)  SpO2: 98% (18 Nov 2022 13:46) (95% - 100%) room air    P/E:  Psych: AAO x3  Neuro: No gross focal deficits; Power and sensation intact; mild left arm weakness and slight faciaol droop (old vs new)  CVS: S1S2 present, regular, no edema  Resp: BLAE+, No wheeze or Rhonchi  GI: Soft, BS+, Non tender, non distended  Extr: No  calf tenderness B/L Lower extremities  Skin: Warm and moist without any rashes    Labs:                        12.1   8.30  )-----------( 232      ( 18 Nov 2022 14:55 )             37.7   11-18    143  |  110<H>  |  22<H>  ----------------------------<  126<H>  3.8   |  27  |  1.13    Ca    8.4      18 Nov 2022 14:55    TPro  6.6  /  Alb  3.1<L>  /  TBili  0.3  /  DBili  x   /  AST  11  /  ALT  20  /  AlkPhos  73  11-18    CT Angio Head w/ IV Cont (11.18.22 @ 13:02)     ACC: [id number] EXAM:  CT PERFUSION W MAPS                         ACC: [id number] EXAM:  CT ANGIO BRAIN (W) IC                        ACC: [id number] EXAM:  CT BRAIN STROKE PROTOCOL                        ACC: [id number] EXAM:  CT ANGIO NECK (W)[facility]                      PROCEDURE DATE:  11/18/2022      IMPRESSION:  *  HEAD CT DEGRADED BY MOTION. NO INTRACRANIAL HEMORRHAGE, MIDLINE SHIFT, OR HYDROCEPHALUS.  *  NO HEMODYNAMIC SIGNIFICANT NARROWING WITHIN THE NECK.  *  NO PROXIMAL LARGE VESSEL OCCLUSION INTRACRANIALLY.  *  NO AREAS OF ISCHEMIA IDENTIFIED ON PERFUSION IMAGING. Patient seen and examined in PACU with CODE STROKE TEAM; [place]  used: Georgian (Maqbokendell) ID# [id number]    62 y/o Female, Georgian-speaking F from [place] w/ PMH of HTN, OA. Patient is a poor historian. PGY2 obtained Collateral Hx was attempted from her son, Alex Newberry ([phone]) but he is not sure about patient's pharmacy and PCP. Patient was scheduled for elective Right total knee replacement on 11/18/22. She did not take her medications on the day of admission as she she was NPO after midnight. Pre-Op she was complaining of severe headache at the back of her head, nausea and dizziness. Her admission BP was 169/102 but went up to 215/111. Patient got Versed in OR. The surgery was cancelled and patient was transferred to PACU for close monitoring. Medicine was consulted and patient was subsequently admitted.    On PGY2 assessment patient was noted ot have a facial droop (unclear if old or new). Code stroke called    Patient was AAOx 3, able to answer all questions appropriately. minor weakness noted on Left arm. No other neurological deficits noted.     ROS/FH/SH: as above    Vital Signs Last 24 Hrs  T(C): 36.6 (18 Nov 2022 13:46), Max: 36.7 (18 Nov 2022 07:40)  T(F): 97.9 (18 Nov 2022 13:46), Max: 98.1 (18 Nov 2022 07:40)  HR: 78 (18 Nov 2022 13:46) (69 - 87)  BP: 165/92 (18 Nov 2022 13:46) (128/77 - 169/102)  BP(mean): 105 (18 Nov 2022 11:50) (88 - 118)  RR: 18 (18 Nov 2022 13:46) (15 - 22)  SpO2: 98% (18 Nov 2022 13:46) (95% - 100%) room air    P/E:  Psych: AAO x3; stable mood  Neuro: No gross focal deficits; Power and sensation intact; mild left arm weakness and slight facial droop (old vs new)  CVS: S1S2 present, regular, no edema  Resp: BLAE+, No wheeze or Rhonchi  GI: Soft, BS+, Non tender, non distended  Extr: No  calf tenderness B/L Lower extremities  Skin: Warm and moist without any rashes    Labs:                      12.1   8.30  )-----------( 232      ( 18 Nov 2022 14:55 )             37.7   11-18    143  |  110<H>  |  22<H>  ----------------------------<  126<H>  3.8   |  27  |  1.13    Ca    8.4      18 Nov 2022 14:55    TPro  6.6  /  Alb  3.1<L>  /  TBili  0.3  /  DBili  x   /  AST  11  /  ALT  20  /  AlkPhos  73  11-18    CT Angio Head w/ IV Cont (11.18.22 @ 13:02)     ACC: [id number] EXAM:  CT PERFUSION W MAPS IC                        ACC: [id number] EXAM:  CT ANGIO BRAIN (W)AW IC                        ACC: [id number] EXAM:  CT BRAIN STROKE PROTOCOL                        ACC: [id number] EXAM:  CT ANGIO NECK (W) IC                      PROCEDURE DATE:  11/18/2022      IMPRESSION:  *  HEAD CT DEGRADED BY MOTION. NO INTRACRANIAL HEMORRHAGE, MIDLINE SHIFT, OR HYDROCEPHALUS.  *  NO HEMODYNAMIC SIGNIFICANT NARROWING WITHIN THE NECK.  *  NO PROXIMAL LARGE VESSEL OCCLUSION INTRACRANIALLY.  *  NO AREAS OF ISCHEMIA IDENTIFIED ON PERFUSION IMAGING.    D/D:  HTN urgency  Facial droop and left arm weakness concerning for CVA, although less likely  CARLOS possibly HTN kidney disease  Hx OA Right knee    Plan:  Telemetry rule out arrhythmia  ASA/ Plavix/ High dose statin; 2D Echo with bubble study  CT Brain stroke protocol and CT Angio negative  Neuro consult Dr. Thompson  Cardio Consult Dr. Jaquez  Permissive HTN for 24 hrs; Resume home dose anti HTN meds AM  If SBP elevated more than 180 mm Hg, may use Enalapril 1.25 mg IV q 6hrs PRN  Monitor renal fn closely  DVT ppx    Discussed with patient findings and plan of care with Georgian  as above  PGY2 updated family; get more information about pharmacy and home meds  Discussed with PGY1/ PGY2 Dr. Rodriguez/ Dr. Nur and PGY3 Dr. Michaels and Ortho/ Anaesthesia/ Nursing staff

## 2022-11-18 NOTE — ASU PATIENT PROFILE, ADULT - NSICDXPASTMEDICALHX_GEN_ALL_CORE_FT
PAST MEDICAL HISTORY:  CKD (chronic kidney disease)     COVID-19 virus infection 4/2021    HTN (hypertension)     Prediabetes     Primary osteoarthritis of both knees     Subclinical hyperthyroidism

## 2022-11-18 NOTE — CONSULT NOTE ADULT - SUBJECTIVE AND OBJECTIVE BOX
***TEMPLATE ONLY***      Patient is a 61y old  Female who presents with a chief complaint of HTN emergency (r/o CVA) (18 Nov 2022 13:33)      HPI:  Patient is a 62 y/o F, German-speaking F from [place] w/ PMHx of HTN, OA. Patient is a poor historian. Collateral Hx was attempted from her son, Alex Newberry ([phone]) but he is not sure about patient's pharmacy and PCP. Patient is complaining of She was scheduled for elective R total knee replacement on 11/18/22. She did not take her medications on the day of admission. Pre-Op she was complaining of severe headache at the back of her head, nausea and dizziness. Her admission BP was 169/102 but went up to 215/111. The surgery was cancelled and patient was transferred to PACU for close monitoring. Medicine was consulted and patient was subsequently admitted. She continued to have above symptoms. Her BP was noted to be -160s. She received a dose of IV Zofran and Tylenol. She passed her dysphagia screen. Upon PE, patient was noted to be AAOx3, had some confusion and left lower facial droop w/ associated numbness. She also had weakness on her LUE (4/5). CODE STROKE was called. NIHSS was 2. Neurology (Dr. Thompson) was consulted. CTA H&N showed no acute infarct nor bleed. Patient was subsequently transferred to  for Telemetry monitoring.    GOC: FULL CODE (18 Nov 2022 13:33)           The patient was last know well at  The patient lives at home/ NH.  The patient walks without assistance/ with a cane or walker    Neurological Review of Systems:  No difficulty with language.  No vision loss or double vision.  No dizziness, vertigo or new hearing loss.  No difficulty with speech or swallowing.  No focal weakness.  No focal sensory changes.  No numbness or tingling in the bilateral lower extremities.  No difficulty with balance.  No difficulty with ambulation.      MEDICATIONS  (STANDING):  aspirin  chewable 81 milliGRAM(s) Oral daily  atorvastatin 80 milliGRAM(s) Oral at bedtime  clopidogrel Tablet 75 milliGRAM(s) Oral daily  sodium chloride 0.9% lock flush 3 milliLiter(s) IV Push every 8 hours    MEDICATIONS  (PRN):  acetaminophen     Tablet .. 650 milliGRAM(s) Oral every 6 hours PRN Mild Pain (1 - 3)  ondansetron Injectable 4 milliGRAM(s) IV Push every 8 hours PRN Nausea and/or Vomiting    Allergies    No Known Allergies    Intolerances      PAST MEDICAL & SURGICAL HISTORY:  HTN (hypertension)      Primary osteoarthritis of both knees      Prediabetes      CKD (chronic kidney disease)      Subclinical hyperthyroidism      COVID-19 virus infection  4/2021      History of nephrolithotomy with removal of calculi  bilateral in 1994 and 1995        FAMILY HISTORY:  Family history of stomach cancer (Mother)    Family history of diabetes mellitus (Father)      SOCIAL HISTORY: non smoker/ former smoker/ active smoker    Review of Systems:  Constitutional: No generalized weakness. No fevers or chills.                    Eyes, Ears, Mouth, Throat: No vision loss   Respiratory: No shortness of breath or cough.                                Cardiovascular: No chest pain or palpitations  Gastrointestinal: No nausea or vomiting.                                         Genitourinary: No urinary incontinence or burning on urination.  Musculoskeletal: No joint pain.                                                           Dermatologic: No rash.  Neurological: as per HPI                                                                      Psychiatric: No behavioral problems.  Endocrine: No known hypoglycemia.               Hematologic/Lymphatic: No easy bleeding.    O:  Vital Signs Last 24 Hrs  T(C): 36.6 (18 Nov 2022 13:46), Max: 36.7 (18 Nov 2022 07:40)  T(F): 97.9 (18 Nov 2022 13:46), Max: 98.1 (18 Nov 2022 07:40)  HR: 78 (18 Nov 2022 13:46) (69 - 87)  BP: 165/92 (18 Nov 2022 13:46) (128/77 - 169/102)  BP(mean): 105 (18 Nov 2022 11:50) (88 - 118)  RR: 18 (18 Nov 2022 13:46) (15 - 22)  SpO2: 98% (18 Nov 2022 13:46) (95% - 100%)    Parameters below as of 18 Nov 2022 13:46  Patient On (Oxygen Delivery Method): room air        General Exam:   General appearance: No acute distress                 Cardiovascular: Pedal dorsalis pulses intact bilaterally    Neurological Exam:  NIH Stroke Scale (NIHSS):   1a. LOConscious:  0-alert 1-lethargy 2-obtund 3-coma:    _____  1b. LOC Questions:  0-both 1-one 2-none                       _____  1c. LOC Commands:  0-both 1-one 2-none                     _____  2.   Gaze:  0-nl 1-partial 2-conjugate                                _____  3.   Visual:  0-nl 1-part little 2-full little 3-bilat little         _____  4.   Facial Palsy:  0-nl 1-minor 2-part 3-complete             _____  5.   Motor Arm:  0-nl 1-drift 2-effort 3-no effort         Left             _____                              4-no move UN-amputated                     Right  _____  6.   Motor Leg:                                                                 Left   _____                                                                                   Right _____  7.   Ataxia:  0-nl 1-one limb 2-two UN-amp                      _____  8.   Sensory:  0-nl 1-mild 2-severe                                  _____  9.   Language:  0-nl 1-mild 2-severe 3-mute                     _____  10.  Dysarthria:  0-nl 1-mild 2-severe 3-barrier                  _____  11.  Extinction/Inattention:  0-nl 1-mild 2-deep                 _____         TOTAL NIHSS       ________    MRS Score:  _____  (MRS Scoring.  No symptoms at all: 0;   No significant disability despite symptoms; able to carry out all usual duties and activities: +1;  Slight disability; unable to carry out all previous activities, but able to look after own affairs without assistance: +2;  Moderate disability; requiring some help, but able to walk without assistance: +3;  Moderately severe disability; unable to walk and attend to bodily needs without assistance: +4;   Severe disability; bedridden, incontinent and requiring constant nursing care and attention:  +5;  Dead: +6)    Mental Status: Orientated to self, date and place.  Attention intact.  No dysarthria, aphasia or neglect.  Knowledge intact.  Registration intact.  Short and long term memory grossly intact.      Cranial Nerves: CN I - not tested.  PERRL, EOMI, VFF, no nystagmus or diplopia.  No APD.  Fundi not visualized bilaterally.  CN V1-3 intact to light touch.  No facial asymmetry.  Hearing intact to finger rub bilaterally.  Tongue, uvula and palate midline.  Sternocleidomastoid and Trapezius intact bilaterally.    Motor:   Tone: normal.                  Strength intact throughout  No pronator drift bilaterally                      No dysmetria on finger-nose-finger or heel-shin-heel  No truncal ataxia.  No resting, postural or action tremor.  No myoclonus.    Sensation: intact to light touch    Deep Tendon Reflexes: 1+ bilateral biceps, triceps, brachioradialis, knee and ankle  Toes flexor bilaterally    Gait: normal and stable.  Rhomberg -lencho.    Other:      LABS:              LDL  HbA1C    RADIOLOGY & ADDITIONAL STUDIES:    < from: 12 Lead ECG (11.18.22 @ 08:37) >    Ventricular Rate 75 BPM    Atrial Rate 75 BPM    P-R Interval 182 ms    QRS Duration 86 ms    Q-T Interval 376 ms    QTC Calculation(Bazett) 419 ms    P Axis 51 degrees    R Axis -9 degrees    T Axis 50 degrees    Diagnosis Line Normal sinus rhythm  Minimal voltage criteria for LVH, may be normal variant  Borderline ECG    Confirmed by NELSON MERRITT MD ([id number]) on 11/18/2022 12:03:23 PM    < end of copied text >  < from: CT Perfusion w/ Maps w/ IV Cont (11.18.22 @ 13:09) > (images reviviewed)  IMPRESSION:  *  HEAD CT DEGRADED BY MOTION. NO INTRACRANIAL HEMORRHAGE, MIDLINE SHIFT,   OR HYDROCEPHALUS.  *  NO HEMODYNAMIC SIGNIFICANT NARROWING WITHIN THE NECK.  *  NO PROXIMAL LARGE VESSEL OCCLUSION INTRACRANIALLY.  *  NO AREAS OF ISCHEMIA IDENTIFIED ON PERFUSION IMAGING.    < end of copied text >      Impression:       Recommendations:  1.             Admit to telemetry for 24hours to evaluate for arrythmias/ Afib.  2.             MRI brain, MRA head without contrast, Carotid duplex (CD).  If unable to get MR imaging, please consider CTA head and neck in 24hours (no need for CD in this case).  If the patient is unable to get MR and unable to get IV contrast please repeat the CTH in 24hours and get a CD.  Role of the imaging is to evaluate for stroke and evaluate vasculature for artherosclerosis/ thrombi which may have lead to a stroke.  3.             TTE  4.             Please check HbA1C and fasting lipid profile  5.             Secondary stroke prevention: ASA 81mg (or ASA 325mg rectally if unable to take p) and Lipitor 40mg HS; Plavix x 3 weeks.  If the patient is on anticoagulation, there is no neurological need for ASA or Plavix.  6.             BP goal of normal/ permissive HTN of SBP <200/<180<160  7.             NS at 125 cc/h/ D5 NS at 125 cc/hour, if NPO, if cardiac function allows, to ensure good perfusion  8.             Frequent neurochecks  9.             Urine Tox  10.          Able to resume normal diet as passed bedside swallowing test/ NPO for now  11.          Formal speech and swallow evaluation  12.          PT evaluation  13.          STAT CTH IF the patient has sudden change in mental status or neurological exam  14.          DVT PPx    Thank you for the courtesy of this consult.         Patient is a 61y old  Female who presents with a chief complaint of HTN emergency (r/o CVA) (18 Nov 2022 13:33)      HPI:  Patient is a 62 y/o F, Welsh-speaking F from [place] w/ PMHx of HTN, OA here for knee replacement, neuro called for CVA.  The patient was c/o headache and nausea, BP was 220's.  She was noted to have left facial weakness, as well as left arm and leg weakness, onset of symptoms was uknown and thus the patient did not qualify for ivtpa.  NIHSS was 2.    symptoms imporoved  continues to have headache, although improved    The patient lives at home.  The patient walks without assistance    Neurological Review of Systems:  No difficulty with language.  No vision loss or double vision.  No dizziness, vertigo or new hearing loss.  No difficulty with speech or swallowing.  No focal sensory changes.  NNo difficulty with balance.  No difficulty with ambulation.      MEDICATIONS  (STANDING):  aspirin  chewable 81 milliGRAM(s) Oral daily  atorvastatin 80 milliGRAM(s) Oral at bedtime  clopidogrel Tablet 75 milliGRAM(s) Oral daily  sodium chloride 0.9% lock flush 3 milliLiter(s) IV Push every 8 hours    MEDICATIONS  (PRN):  acetaminophen     Tablet .. 650 milliGRAM(s) Oral every 6 hours PRN Mild Pain (1 - 3)  ondansetron Injectable 4 milliGRAM(s) IV Push every 8 hours PRN Nausea and/or Vomiting    Allergies    No Known Allergies    Intolerances      PAST MEDICAL & SURGICAL HISTORY:  HTN (hypertension)      Primary osteoarthritis of both knees      Prediabetes      CKD (chronic kidney disease)      Subclinical hyperthyroidism      COVID-19 virus infection  4/2021      History of nephrolithotomy with removal of calculi  bilateral in 1994 and 1995        FAMILY HISTORY:  Family history of stomach cancer (Mother)    Family history of diabetes mellitus (Father)      SOCIAL HISTORY: non smoker    Review of Systems:  Constitutional:  No fevers                     Eyes, Ears, Mouth, Throat: No vision loss   Respiratory: No cough.                                Cardiovascular: No chest pain  Gastrointestinal: No  vomiting.                                         Genitourinary: No urinary incontinence.  Musculoskeletal: + joint pain.                                                           Dermatologic: No rash.  Neurological: as per HPI                                                                      Psychiatric: No behavioral problems.  Endocrine: No known hypoglycemia.               Hematologic/Lymphatic: No easy bleeding.    O:  Vital Signs Last 24 Hrs  T(C): 36.6 (18 Nov 2022 13:46), Max: 36.7 (18 Nov 2022 07:40)  T(F): 97.9 (18 Nov 2022 13:46), Max: 98.1 (18 Nov 2022 07:40)  HR: 78 (18 Nov 2022 13:46) (69 - 87)  BP: 165/92 (18 Nov 2022 13:46) (128/77 - 169/102)  BP(mean): 105 (18 Nov 2022 11:50) (88 - 118)  RR: 18 (18 Nov 2022 13:46) (15 - 22)  SpO2: 98% (18 Nov 2022 13:46) (95% - 100%)    Parameters below as of 18 Nov 2022 13:46  Patient On (Oxygen Delivery Method): room air        General Exam:   General appearance: No acute distress                 Cardiovascular: Pedal dorsalis pulses intact bilaterally    Neurological Exam:  NIH Stroke Scale (NIHSS):   1a. LOConscious:  0-alert 1-lethargy 2-obtund 3-coma:    ___0__  1b. LOC Questions:  0-both 1-one 2-none                       ___0__  1c. LOC Commands:  0-both 1-one 2-none                     ____0_  2.   Gaze:  0-nl 1-partial 2-conjugate                                ____0_  3.   Visual:  0-nl 1-part little 2-full little 3-bilat little         _____0  4.   Facial Palsy:  0-nl 1-minor 2-part 3-complete             ___1__  5.   Motor Arm:  0-nl 1-drift 2-effort 3-no effort         Left             __0___                              4-no move UN-amputated                     Right  __0___  6.   Motor Leg:                                                                 Left   ___0__                                                                                   Right ___0__  7.   Ataxia:  0-nl 1-one limb 2-two UN-amp                      ___0__  8.   Sensory:  0-nl 1-mild 2-severe                                  ____0_  9.   Language:  0-nl 1-mild 2-severe 3-mute                     ___0__  10.  Dysarthria:  0-nl 1-mild 2-severe 3-barrier                  __0___  11.  Extinction/Inattention:  0-nl 1-mild 2-deep                 ___0__         TOTAL NIHSS       __1______    MRS Score:  __0___  (MRS Scoring.  No symptoms at all: 0;   No significant disability despite symptoms; able to carry out all usual duties and activities: +1;  Slight disability; unable to carry out all previous activities, but able to look after own affairs without assistance: +2;  Moderate disability; requiring some help, but able to walk without assistance: +3;  Moderately severe disability; unable to walk and attend to bodily needs without assistance: +4;   Severe disability; bedridden, incontinent and requiring constant nursing care and attention:  +5;  Dead: +6)    Mental Status: Orientated to self, date and place.  Attention intact.  No dysarthria, aphasia or neglect.  Knowledge intact.  Registration intact.  Short and long term memory grossly intact.      Cranial Nerves: CN I - not tested.  PERRL, EOMI, VFF, no nystagmus or diplopia.  No APD.  Fundi not visualized bilaterally.  CN V1-3 intact to light touch.  Left NLF. Hearing intact to finger rub bilaterally.  Tongue, uvula and palate midline.  Sternocleidomastoid and Trapezius intact bilaterally.    Motor:   Tone: normal.                  Strength intact throughout  No pronator drift bilaterally                      No dysmetria on finger-nose-finger or heel-shin-heel  No truncal ataxia.  No resting, postural or action tremor.  No myoclonus.    Sensation: intact to light touch    Deep Tendon Reflexes: 1+ bilateral biceps, triceps, brachioradialis, knee and ankle  Toes flexor bilaterally    Gait: normal and stable.     Other:      LABS:              LDL  HbA1C    RADIOLOGY & ADDITIONAL STUDIES:    < from: 12 Lead ECG (11.18.22 @ 08:37) >    Ventricular Rate 75 BPM    Atrial Rate 75 BPM    P-R Interval 182 ms    QRS Duration 86 ms    Q-T Interval 376 ms    QTC Calculation(Bazett) 419 ms    P Axis 51 degrees    R Axis -9 degrees    T Axis 50 degrees    Diagnosis Line Normal sinus rhythm  Minimal voltage criteria for LVH, may be normal variant  Borderline ECG    Confirmed by SHAINA LINCOLN, NELSON ([id number]) on 11/18/2022 12:03:23 PM    < end of copied text >  < from: CT Perfusion w/ Maps w/ IV Cont (11.18.22 @ 13:09) > (images reviviewed)  IMPRESSION:  *  HEAD CT DEGRADED BY MOTION. NO INTRACRANIAL HEMORRHAGE, MIDLINE SHIFT,   OR HYDROCEPHALUS.  *  NO HEMODYNAMIC SIGNIFICANT NARROWING WITHIN THE NECK.  *  NO PROXIMAL LARGE VESSEL OCCLUSION INTRACRANIALLY.  *  NO AREAS OF ISCHEMIA IDENTIFIED ON PERFUSION IMAGING.    < end of copied text >

## 2022-11-18 NOTE — H&P ADULT - NSHPSOCIALHISTORY_GEN_ALL_CORE
lives with her son  came from [facility]  non-smoker  non-alcoholic beverage drinker  no illicit drug use

## 2022-11-18 NOTE — H&P ADULT - NSHPREVIEWOFSYSTEMS_GEN_ALL_CORE
- CONSTITUTIONAL: Denies fever and chills  - HEENT: Denies changes in vision and hearing.  - RESPIRATORY: Denies SOB and cough.  - CV: Denies chest pain and palpitations  - GI: (+) nausea. Denies abdominal pain, vomiting and diarrhea.  - : Denies dysuria and urinary frequency.  - SKIN: Denies rash and pruritus.  - NEUROLOGICAL: (+) headache, (+) dizziness. Denies syncope.  - PSYCHIATRIC: Denies recent changes in mood. Denies anxiety and depression.

## 2022-11-18 NOTE — H&P ADULT - PROBLEM SELECTOR PLAN 2
h/o HTN on lisinopril, amlodipine  Monitor BP  hold BP meds for 24 hrs permissive HTN h/o HTN on lisinopril, amlodipine  Monitor BP  hold BP meds for 24 hrs permissive HTN  Cardio Consulted: Dr. Jaquez

## 2022-11-18 NOTE — H&P ADULT - HISTORY OF PRESENT ILLNESS
Patient is a 62 y/o F, Mohawk-speaking F from [place] w/ PMHx of HTN, OA. Patient is a poor historian. Collateral Hx was attempted from her son, Alex Newberry ([phone]) but he is not sure about patient's pharmacy and PCP. Patient is complaining of She was scheduled for elective R total knee replacement on 11/18/22. She did not take her medications on the day of admission. Pre-Op she was complaining of severe headache at the back of her head, nausea and dizziness. Her admission BP was 169/102 but went up to 215/111. The surgery was cancelled and patient was transferred to PACU for close monitoring. Medicine was consulted and patient was subsequently admitted. She continued to have above symptoms. Her BP was noted to be -160s. She received a dose of IV Zofran and Tylenol. She passed her dysphagia screen. Upon PE, patient was noted to be AAOx3, had some confusion and left lower facial droop w/ associated numbness. She also had weakness on her LUE (4/5). CODE STROKE was called. NIHSS was 2. Neurology (Dr. Thompson) was consulted. CTA H&N showed no acute infarct nor bleed. Patient was subsequently transferred to  for Telemetry monitoring.    GOC: FULL CODE

## 2022-11-18 NOTE — CONSULT NOTE ADULT - ASSESSMENT
Patient is a 62 y/o F, Serbian-speaking F from [place] w/ PMHx of HTN, OA. Admitted for HTN emergency (r/o CVA)      # Acute CVA (cerebrovascular accident).   ·  Plan: p/w elevated BP, headache, dizziness, nausea  code stroke in PACU with NIHSS 2  ASA qd, high intensity statin qhs, plavix x21 days if true stroke  CTA H/N was neg  Tele monitoring  F/U Echo with bubble study  Pt not rTPA candidate due to: unknown time of onset  Monitor BP - allow permissive htn x24hr from last known normal  PT consult  Dysphagia screen: passed  Neuro checks q4        #  Hypertensive emergency.    on lisinopril, amlodipine  Monitor BP  hold BP meds for 24 hrs permissive HTN
  Impression:  Left face arm and leg weakness as well as headache and nausea in setting of SBP in 220's concerning for posterior circulation stroke, right side, lacunar, related to HTN.       Recommendations:  1.             Admit to telemetry for 24hours to evaluate for arrythmias/ Afib.  2.             MRI brain to eval for cva  3.             TTE  4.             Please check HbA1C and fasting lipid profile  5.             Secondary stroke prevention: ASA 81mg and Lipitor 40mg HS; Plavix x 3 weeks  6.             BP goal of normal, avoid drop more than 25% in 24hours.  7.          PT evaluation  8.          STAT CTH IF the patient has sudden change in mental status or neurological exam  9.          DVT PPx    Thank you for the courtesy of this consult.    desi resident

## 2022-11-18 NOTE — CONSULT NOTE ADULT - TIME BILLING
- Review of records, telemetry, vital signs and daily labs.   - General and cardiovascular physical examination.  - Generation of cardiovascular treatment plan.  - Coordination of care.      Patient was seen and examined by me on 11/18/22,interim events noted,labs and radiology studies reviewed.  Donovan Jaquez MD,FACC.  [address] [place], [place] [id number].  [phone]

## 2022-11-19 DIAGNOSIS — N17.9 ACUTE KIDNEY FAILURE, UNSPECIFIED: ICD-10-CM

## 2022-11-19 LAB
A1C WITH ESTIMATED AVERAGE GLUCOSE RESULT: 6.5 % — HIGH (ref 4–5.6)
ANION GAP SERPL CALC-SCNC: 5 MMOL/L — SIGNIFICANT CHANGE UP (ref 5–17)
BUN SERPL-MCNC: 21 MG/DL — HIGH (ref 7–18)
CALCIUM SERPL-MCNC: 9 MG/DL — SIGNIFICANT CHANGE UP (ref 8.4–10.5)
CHLORIDE SERPL-SCNC: 111 MMOL/L — HIGH (ref 96–108)
CHOLEST SERPL-MCNC: 165 MG/DL — SIGNIFICANT CHANGE UP
CO2 SERPL-SCNC: 29 MMOL/L — SIGNIFICANT CHANGE UP (ref 22–31)
CREAT SERPL-MCNC: 1.14 MG/DL — SIGNIFICANT CHANGE UP (ref 0.5–1.3)
CRP SERPL-MCNC: 4 MG/L — SIGNIFICANT CHANGE UP
EGFR: 54 ML/MIN/1.73M2 — LOW
ERYTHROCYTE [SEDIMENTATION RATE] IN BLOOD: 27 MM/HR — HIGH (ref 0–20)
ESTIMATED AVERAGE GLUCOSE: 140 MG/DL — HIGH (ref 68–114)
GLUCOSE SERPL-MCNC: 94 MG/DL — SIGNIFICANT CHANGE UP (ref 70–99)
HCV AB S/CO SERPL IA: 0.22 S/CO — SIGNIFICANT CHANGE UP (ref 0–0.99)
HCV AB SERPL-IMP: SIGNIFICANT CHANGE UP
HDLC SERPL-MCNC: 44 MG/DL — LOW
LIPID PNL WITH DIRECT LDL SERPL: 95 MG/DL — SIGNIFICANT CHANGE UP
NON HDL CHOLESTEROL: 121 MG/DL — SIGNIFICANT CHANGE UP
POTASSIUM SERPL-MCNC: 3.8 MMOL/L — SIGNIFICANT CHANGE UP (ref 3.5–5.3)
POTASSIUM SERPL-SCNC: 3.8 MMOL/L — SIGNIFICANT CHANGE UP (ref 3.5–5.3)
SODIUM SERPL-SCNC: 145 MMOL/L — SIGNIFICANT CHANGE UP (ref 135–145)
TRIGL SERPL-MCNC: 132 MG/DL — SIGNIFICANT CHANGE UP

## 2022-11-19 PROCEDURE — 71045 X-RAY EXAM CHEST 1 VIEW: CPT | Mod: 26

## 2022-11-19 PROCEDURE — 70551 MRI BRAIN STEM W/O DYE: CPT | Mod: 26

## 2022-11-19 PROCEDURE — 99233 SBSQ HOSP IP/OBS HIGH 50: CPT | Mod: GC

## 2022-11-19 RX ORDER — LISINOPRIL 2.5 MG/1
2.5 TABLET ORAL DAILY
Refills: 0 | Status: DISCONTINUED | OUTPATIENT
Start: 2022-11-19 | End: 2022-11-21

## 2022-11-19 RX ORDER — LISINOPRIL 2.5 MG/1
2.5 TABLET ORAL DAILY
Refills: 0 | Status: DISCONTINUED | OUTPATIENT
Start: 2022-11-19 | End: 2022-11-19

## 2022-11-19 RX ORDER — MECLIZINE HCL 12.5 MG
25 TABLET ORAL EVERY 8 HOURS
Refills: 0 | Status: COMPLETED | OUTPATIENT
Start: 2022-11-19 | End: 2022-11-20

## 2022-11-19 RX ADMIN — Medication 650 MILLIGRAM(S): at 10:14

## 2022-11-19 RX ADMIN — SODIUM CHLORIDE 3 MILLILITER(S): 9 INJECTION INTRAMUSCULAR; INTRAVENOUS; SUBCUTANEOUS at 16:27

## 2022-11-19 RX ADMIN — Medication 650 MILLIGRAM(S): at 11:00

## 2022-11-19 RX ADMIN — CLOPIDOGREL BISULFATE 75 MILLIGRAM(S): 75 TABLET, FILM COATED ORAL at 16:06

## 2022-11-19 RX ADMIN — LISINOPRIL 2.5 MILLIGRAM(S): 2.5 TABLET ORAL at 16:06

## 2022-11-19 RX ADMIN — Medication 1 MILLIGRAM(S): at 13:07

## 2022-11-19 RX ADMIN — Medication 650 MILLIGRAM(S): at 18:48

## 2022-11-19 RX ADMIN — Medication 25 MILLIGRAM(S): at 21:46

## 2022-11-19 RX ADMIN — Medication 81 MILLIGRAM(S): at 16:06

## 2022-11-19 RX ADMIN — ATORVASTATIN CALCIUM 80 MILLIGRAM(S): 80 TABLET, FILM COATED ORAL at 21:46

## 2022-11-19 RX ADMIN — SODIUM CHLORIDE 3 MILLILITER(S): 9 INJECTION INTRAMUSCULAR; INTRAVENOUS; SUBCUTANEOUS at 07:08

## 2022-11-19 RX ADMIN — Medication 25 MILLIGRAM(S): at 16:06

## 2022-11-19 RX ADMIN — Medication 650 MILLIGRAM(S): at 20:06

## 2022-11-19 NOTE — PHYSICAL THERAPY INITIAL EVALUATION ADULT - PERTINENT HX OF CURRENT PROBLEM, REHAB EVAL
Per chart review: "60 y/o F, Macedonian-speaking F from [place] w/ PMHx of HTN, OA here for knee replacement, neuro called for CVA.  The patient was c/o headache and nausea, BP was 220's.  She was noted to have left facial weakness, as well as left arm and leg weakness, onset of symptoms was unknown and thus the patient did not qualify for ivtpa"

## 2022-11-19 NOTE — DISCHARGE NOTE PROVIDER - NSDCFUADDINST_GEN_ALL_CORE_FT
EXERCISE 20 TO 30 MINS DAILY AT LEAST 3 TO 5 DAYS A WEK AND WEIGHT LOSS AS ADVISED  FOLOW UP EAR DOCTOR AS ADVISED; YOU MAY CALL CARDIOLOGIST DR. KIMBROUGH OFFICE WHO MAY BE ABLE TO ASSIST YOU TO FIND A EAR SPECIALIST  ALTERNATIVELY CAN SEE A EAR SPECIALIST OFD YOUR PCP REFERRAL

## 2022-11-19 NOTE — DISCHARGE NOTE PROVIDER - PROVIDER TOKENS
PROVIDER:[TOKEN:[[id number]]] PROVIDER:[TOKEN:[[id number]]],PROVIDER:[TOKEN:[[id number]],FOLLOWUP:[2 weeks]]

## 2022-11-19 NOTE — PROGRESS NOTE ADULT - PROBLEM SELECTOR PLAN 3
Pt w/ SCr 1.46 on admission  unknown baseline SCr  F/U Urine Lytes, calculate FeNa  IVF for now, follow BMP daily Pt w/ SCr 1.46 on admission  unknown baseline SCr  RESOLVED

## 2022-11-19 NOTE — PHYSICAL THERAPY INITIAL EVALUATION ADULT - COORDINATION ASSESSED, REHAB EVAL
grossly intact; (+) delayed response; requires constant cues to follow directions/finger to nose/heel to shin

## 2022-11-19 NOTE — PROGRESS NOTE ADULT - SUBJECTIVE AND OBJECTIVE BOX
PGY-1 Progress Note discussed with attending    PAGER #: [[phone]] TILL 5:00 PM  PLEASE CONTACT ON CALL TEAM:  - On Call Team (Please refer to Lexi) FROM 5:00 PM - 8:30PM  - Nightfloat Team FROM 8:30 -7:30 AM    INTERVAL HPI/OVERNIGHT EVENTS:   Patient seen and examined at bedside. No acute events overnight. Pt complains of worsening headaches and dizziness today.     MEDICATIONS  (STANDING):  aspirin  chewable 81 milliGRAM(s) Oral daily  atorvastatin 80 milliGRAM(s) Oral at bedtime  clopidogrel Tablet 75 milliGRAM(s) Oral daily  lisinopril 2.5 milliGRAM(s) Oral daily  meclizine 25 milliGRAM(s) Oral every 8 hours  sodium chloride 0.9% lock flush 3 milliLiter(s) IV Push every 8 hours    MEDICATIONS  (PRN):  acetaminophen     Tablet .. 650 milliGRAM(s) Oral every 6 hours PRN Mild Pain (1 - 3)  ondansetron Injectable 4 milliGRAM(s) IV Push every 8 hours PRN Nausea and/or Vomiting      REVIEW OF SYSTEMS:  CONSTITUTIONAL: No fever, weight loss, or fatigue  RESPIRATORY: No cough, wheezing, chills or hemoptysis; No shortness of breath  CARDIOVASCULAR: No chest pain, palpitations, dizziness, or leg swelling  GASTROINTESTINAL: No abdominal pain. No nausea, vomiting, or hematemesis; No diarrhea or constipation. No melena or hematochezia.  GENITOURINARY: No dysuria or hematuria, urinary frequency  NEUROLOGICAL: No headaches, memory loss, loss of strength, numbness, or tremors  SKIN: No itching, burning, rashes, or lesions     Vital Signs Last 24 Hrs  T(C): 36.7 (19 Nov 2022 08:18), Max: 36.7 (19 Nov 2022 08:18)  T(F): 98 (19 Nov 2022 08:18), Max: 98 (19 Nov 2022 08:18)  HR: 61 (19 Nov 2022 08:18) (61 - 86)  BP: 149/94 (19 Nov 2022 08:18) (149/94 - 167/104)  BP(mean): 111 (18 Nov 2022 12:50) (105 - 118)  RR: 18 (19 Nov 2022 08:18) (16 - 21)  SpO2: 96% (19 Nov 2022 08:18) (95% - 100%)    Parameters below as of 19 Nov 2022 08:18  Patient On (Oxygen Delivery Method): room air        PHYSICAL EXAMINATION:  GENERAL: NAD, well built  HEAD:  Atraumatic, Normocephalic  EYES:  conjunctiva and sclera clear  NECK: Supple, No JVD, Normal thyroid  CHEST/LUNG: Clear to auscultation. Clear to percussion bilaterally; No rales, rhonchi, wheezing, or rubs  HEART: Regular rate and rhythm; No murmurs, rubs, or gallops  ABDOMEN: Soft, Nontender, Nondistended; Bowel sounds present  NERVOUS SYSTEM:  Alert & Oriented X3,    EXTREMITIES:  2+ Peripheral Pulses, No clubbing, cyanosis, or edema  SKIN: warm dry                          12.1   8.30  )-----------( 232      ( 18 Nov 2022 14:55 )             37.7     11-19    145  |  111<H>  |  21<H>  ----------------------------<  94  3.8   |  29  |  1.14    Ca    9.0      19 Nov 2022 06:21    TPro  6.6  /  Alb  3.1<L>  /  TBili  0.3  /  DBili  x   /  AST  11  /  ALT  20  /  AlkPhos  73  11-18    LIVER FUNCTIONS - ( 18 Nov 2022 14:55 )  Alb: 3.1 g/dL / Pro: 6.6 g/dL / ALK PHOS: 73 U/L / ALT: 20 U/L DA / AST: 11 U/L / GGT: x           CARDIAC MARKERS ( 18 Nov 2022 14:55 )  x     / x     / 18 U/L / x     / <1.0 ng/mL      PT/INR - ( 18 Nov 2022 14:55 )   PT: 11.9 sec;   INR: 1.00 ratio         PTT - ( 18 Nov 2022 14:55 )  PTT:28.9 sec    CAPILLARY BLOOD GLUCOSE      RADIOLOGY & ADDITIONAL TESTS:                   PGY-1 Progress Note discussed with attending    PAGER #: [[phone]] TILL 5:00 PM  PLEASE CONTACT ON CALL TEAM:  - On Call Team (Please refer to Lexi) FROM 5:00 PM - 8:30PM  - Nightfloat Team FROM 8:30 -7:30 AM    INTERVAL HPI/OVERNIGHT EVENTS:   Patient seen and examined at bedside. No acute events overnight. Pt complains of worsening headaches and dizziness today.     MEDICATIONS  (STANDING):  aspirin  chewable 81 milliGRAM(s) Oral daily  atorvastatin 80 milliGRAM(s) Oral at bedtime  clopidogrel Tablet 75 milliGRAM(s) Oral daily  lisinopril 2.5 milliGRAM(s) Oral daily  meclizine 25 milliGRAM(s) Oral every 8 hours  sodium chloride 0.9% lock flush 3 milliLiter(s) IV Push every 8 hours    MEDICATIONS  (PRN):  acetaminophen     Tablet .. 650 milliGRAM(s) Oral every 6 hours PRN Mild Pain (1 - 3)  ondansetron Injectable 4 milliGRAM(s) IV Push every 8 hours PRN Nausea and/or Vomiting      REVIEW OF SYSTEMS:  CONSTITUTIONAL: No fever, weight loss, or fatigue  RESPIRATORY: No cough, wheezing, chills or hemoptysis; No shortness of breath  CARDIOVASCULAR: No chest pain, palpitations, dizziness, or leg swelling  GASTROINTESTINAL: No abdominal pain. No nausea, vomiting, or hematemesis; No diarrhea or constipation. No melena or hematochezia.  GENITOURINARY: No dysuria or hematuria, urinary frequency  NEUROLOGICAL: No memory loss, loss of strength, numbness, or tremors +A  SKIN: No itching, burning, rashes, or lesions     Vital Signs Last 24 Hrs  T(C): 36.7 (19 Nov 2022 08:18), Max: 36.7 (19 Nov 2022 08:18)  T(F): 98 (19 Nov 2022 08:18), Max: 98 (19 Nov 2022 08:18)  HR: 61 (19 Nov 2022 08:18) (61 - 86)  BP: 149/94 (19 Nov 2022 08:18) (149/94 - 167/104)  BP(mean): 111 (18 Nov 2022 12:50) (105 - 118)  RR: 18 (19 Nov 2022 08:18) (16 - 21)  SpO2: 96% (19 Nov 2022 08:18) (95% - 100%)    Parameters below as of 19 Nov 2022 08:18  Patient On (Oxygen Delivery Method): room air        PHYSICAL EXAMINATION:  GENERAL: Mild distress, laying in bed with bed tilted up  HEAD:  Atraumatic, Normocephalic  EYES:  conjunctiva and sclera clear  NECK: Supple, No JVD, Normal thyroid  CHEST/LUNG: Clear to auscultation. Clear to percussion bilaterally; No rales, rhonchi, wheezing, or rubs  HEART: Regular rate and rhythm; No murmurs, rubs, or gallops  ABDOMEN: Soft, Nontender, Nondistended; Bowel sounds present  NERVOUS SYSTEM:  Alert & Oriented X3,    EXTREMITIES:  2+ Peripheral Pulses, No clubbing, cyanosis, or edema  SKIN: warm dry                          12.1   8.30  )-----------( 232      ( 18 Nov 2022 14:55 )             37.7     11-19    145  |  111<H>  |  21<H>  ----------------------------<  94  3.8   |  29  |  1.14    Ca    9.0      19 Nov 2022 06:21    TPro  6.6  /  Alb  3.1<L>  /  TBili  0.3  /  DBili  x   /  AST  11  /  ALT  20  /  AlkPhos  73  11-18    LIVER FUNCTIONS - ( 18 Nov 2022 14:55 )  Alb: 3.1 g/dL / Pro: 6.6 g/dL / ALK PHOS: 73 U/L / ALT: 20 U/L DA / AST: 11 U/L / GGT: x           CARDIAC MARKERS ( 18 Nov 2022 14:55 )  x     / x     / 18 U/L / x     / <1.0 ng/mL      PT/INR - ( 18 Nov 2022 14:55 )   PT: 11.9 sec;   INR: 1.00 ratio         PTT - ( 18 Nov 2022 14:55 )  PTT:28.9 sec    CAPILLARY BLOOD GLUCOSE      RADIOLOGY & ADDITIONAL TESTS:                   PGY-1 Progress Note discussed with attending    PAGER #: [[phone]] TILL 5:00 PM  PLEASE CONTACT ON CALL TEAM:  - On Call Team (Please refer to Lexi) FROM 5:00 PM - 8:30PM  - Nightfloat Team FROM 8:30 -7:30 AM    INTERVAL HPI/OVERNIGHT EVENTS:   Patient seen and examined at bedside. No acute events overnight. Pt complains of worsening headaches and dizziness today.     MEDICATIONS  (STANDING):  aspirin  chewable 81 milliGRAM(s) Oral daily  atorvastatin 80 milliGRAM(s) Oral at bedtime  clopidogrel Tablet 75 milliGRAM(s) Oral daily  lisinopril 2.5 milliGRAM(s) Oral daily  meclizine 25 milliGRAM(s) Oral every 8 hours  sodium chloride 0.9% lock flush 3 milliLiter(s) IV Push every 8 hours    MEDICATIONS  (PRN):  acetaminophen     Tablet .. 650 milliGRAM(s) Oral every 6 hours PRN Mild Pain (1 - 3)  ondansetron Injectable 4 milliGRAM(s) IV Push every 8 hours PRN Nausea and/or Vomiting      REVIEW OF SYSTEMS:  CONSTITUTIONAL: No fever, weight loss, or fatigue  RESPIRATORY: No cough, wheezing, chills or hemoptysis; No shortness of breath  CARDIOVASCULAR: No chest pain, palpitations, dizziness, or leg swelling  GASTROINTESTINAL: No abdominal pain. No nausea, vomiting, or hematemesis; No diarrhea or constipation. No melena or hematochezia.  GENITOURINARY: No dysuria or hematuria, urinary frequency  NEUROLOGICAL: No memory loss, loss of strength, numbness, or tremors +headache  SKIN: No itching, burning, rashes, or lesions     Vital Signs Last 24 Hrs  T(C): 36.7 (19 Nov 2022 08:18), Max: 36.7 (19 Nov 2022 08:18)  T(F): 98 (19 Nov 2022 08:18), Max: 98 (19 Nov 2022 08:18)  HR: 61 (19 Nov 2022 08:18) (61 - 86)  BP: 149/94 (19 Nov 2022 08:18) (149/94 - 167/104)  BP(mean): 111 (18 Nov 2022 12:50) (105 - 118)  RR: 18 (19 Nov 2022 08:18) (16 - 21)  SpO2: 96% (19 Nov 2022 08:18) (95% - 100%)    Parameters below as of 19 Nov 2022 08:18  Patient On (Oxygen Delivery Method): room air        PHYSICAL EXAMINATION:  GENERAL: Mild distress, laying in bed with bed tilted up  HEAD:  Atraumatic, Normocephalic  EYES:  conjunctiva and sclera clear  NECK: Supple, No JVD, Normal thyroid  CHEST/LUNG: Clear to auscultation. Clear to percussion bilaterally; No rales, rhonchi, wheezing, or rubs  HEART: Regular rate and rhythm; No murmurs, rubs, or gallops  ABDOMEN: Soft, Nontender, Nondistended; Bowel sounds present  NERVOUS SYSTEM:  Alert & Oriented X3,    EXTREMITIES:  2+ Peripheral Pulses, No clubbing, cyanosis, or edema  SKIN: warm dry                          12.1   8.30  )-----------( 232      ( 18 Nov 2022 14:55 )             37.7     11-19    145  |  111<H>  |  21<H>  ----------------------------<  94  3.8   |  29  |  1.14    Ca    9.0      19 Nov 2022 06:21    TPro  6.6  /  Alb  3.1<L>  /  TBili  0.3  /  DBili  x   /  AST  11  /  ALT  20  /  AlkPhos  73  11-18    LIVER FUNCTIONS - ( 18 Nov 2022 14:55 )  Alb: 3.1 g/dL / Pro: 6.6 g/dL / ALK PHOS: 73 U/L / ALT: 20 U/L DA / AST: 11 U/L / GGT: x           CARDIAC MARKERS ( 18 Nov 2022 14:55 )  x     / x     / 18 U/L / x     / <1.0 ng/mL      PT/INR - ( 18 Nov 2022 14:55 )   PT: 11.9 sec;   INR: 1.00 ratio         PTT - ( 18 Nov 2022 14:55 )  PTT:28.9 sec    CAPILLARY BLOOD GLUCOSE      RADIOLOGY & ADDITIONAL TESTS:

## 2022-11-19 NOTE — PHYSICAL THERAPY INITIAL EVALUATION ADULT - GAIT DISTANCE, PT EVAL
~ 8 steps bedside; deferred further ambulation due to c/o dizziness and headache; pt is hypertensive during assessment (163/100-RN aware)

## 2022-11-19 NOTE — DISCHARGE NOTE PROVIDER - NSFOLLOWUPCLINICS_GEN_ALL_ED_FT
[facility]  ENT  [address] [place] [address]  Phone: [phone]  Fax: [phone]    [facility]  Internal Medicine  [address] [place] [address]  Phone: [phone]  Fax: [phone]

## 2022-11-19 NOTE — DISCHARGE NOTE PROVIDER - NSDCCPCAREPLAN_GEN_ALL_CORE_FT
PRINCIPAL DISCHARGE DIAGNOSIS  Diagnosis: TIA (transient ischemic attack)  Assessment and Plan of Treatment: You were about to have a procedure done however your blood pressure increased and you were experiencing some new symptoms including a facial droop on the elft side and some leg weakness. You had a few imaging studies done including a CT and MRI that were both negative for a ischemic stroke event, or thrombotic stroke event. However you were still deemed to have a transiet ischemic attack due to the resolvement of your symptoms. You are at risk of having future episodes. Please continue to follow with a cardiologist and your primary medical doctor when you leave.      SECONDARY DISCHARGE DIAGNOSES  Diagnosis: Hypertensive emergency  Assessment and Plan of Treatment: You were found to have a high blood pressure in the preoperative room. You were given some zofran and tylenol which helped with some of your other symptoms. Your blood pressure lowered on its own and you were restarted on your home medication of lisinopril 2.5mg daily.   Continue taking your home medications as prescribed.    Diagnosis: CARLOS (acute kidney injury)  Assessment and Plan of Treatment: You were found to have an elevated marker for kidney injury. Your kidney function improved with adequate hydration. Please continue to monitor your kidney function with your primary medical doctor.     PRINCIPAL DISCHARGE DIAGNOSIS  Diagnosis: TIA (transient ischemic attack)  Assessment and Plan of Treatment: You presented to the hospital for outpatient Right Knee replacement.   You were about to have a procedure done however your blood pressure was significantly elevated and you were experiencing some new symptoms including a slight facial droop on the left side and some arm numbnes /weakness. You had a few imaging studies done including a CT Brain, CT Angogram as well as perfusion stusdies all negative for an acute stroke. You was seen and evalauated by Neurolgist Dr. Thompson recommended an MR Brain which was also negative for acute stroke. MR Brain did show some fullness in Mastoid and fluid in middle earand  MRI that were both negative for a ischemic stroke event, or thrombotic stroke event. However you were still deemed to have a transiet ischemic attack due to the resolvement of your symptoms. You are at risk of having future episodes. Please continue to follow with a cardiologist and your primary medical doctor when you leave.      SECONDARY DISCHARGE DIAGNOSES  Diagnosis: Hypertensive emergency  Assessment and Plan of Treatment: You were found to have a high blood pressure in the preoperative room. You were given some zofran and tylenol which helped with some of your other symptoms. Your blood pressure lowered on its own and you were restarted on your home medication of lisinopril 2.5mg daily.   Continue taking your home medications as prescribed.    Diagnosis: CARLOS (acute kidney injury)  Assessment and Plan of Treatment: You were found to have an elevated marker for kidney injury. Your kidney function improved with adequate hydration. Please continue to monitor your kidney function with your primary medical doctor.     PRINCIPAL DISCHARGE DIAGNOSIS  Diagnosis: TIA (transient ischemic attack)  Assessment and Plan of Treatment: You presented to the hospital for outpatient Right Knee replacement.   You were about to have a procedure done however your blood pressure was significantly elevated and you were experiencing some new symptoms including a slight facial droop on the left side and some arm numbnes /weakness. You had a few imaging studies done including a CT Brain, CT Angogram as well as perfusion stusdies all negative for an acute stroke. You was seen and evalauated by Neurolgist Dr. Thompson recommended an MR Brain which was also negative for acute stroke. MR Brain did show some fullness in Mastoid and fluid in middle earand  MRI that were both negative for a ischemic stroke event, or thrombotic stroke event. However you were still deemed to have a transiet ischemic attack due to the resolvement of your symptoms. You are at risk of having future episodes. Please continue to follow with a cardiologist and your primary medical doctor when you leave.      SECONDARY DISCHARGE DIAGNOSES  Diagnosis: Prediabetes  Assessment and Plan of Treatment: You were found to have an elevated A1c which is a marker for your blood glucose    Diagnosis: Hypertensive emergency  Assessment and Plan of Treatment: You were found to have a high blood pressure in the preoperative room. You were given some zofran and tylenol which helped with some of your other symptoms. Your blood pressure lowered on its own and you were restarted on your home medication of lisinopril 2.5mg daily.   Continue taking your home medications as prescribed.    Diagnosis: CARLOS (acute kidney injury)  Assessment and Plan of Treatment: You were found to have an elevated marker for kidney injury. Your kidney function improved with adequate hydration. Please continue to monitor your kidney function with your primary medical doctor.     PRINCIPAL DISCHARGE DIAGNOSIS  Diagnosis: TIA (transient ischemic attack)  Assessment and Plan of Treatment: You presented to the hospital for outpatient Right Knee replacement.   You were about to have a procedure done however your blood pressure was significantly elevated and you were experiencing some new symptoms including a slight facial droop on the left side and some arm numbnes /weakness. You had a few imaging studies done including a CT Brain, CT Angogram as well as perfusion stusdies all negative for an acute stroke. You was seen and evalauated by Neurolgist Dr. Thompson recommended an MR Brain which was also negative for acute stroke. MR Brain did show some fullness in Mastoid and fluid in middle earand  MRI that were both negative for a ischemic stroke event, or thrombotic stroke event. However you were still deemed to have a transiet ischemic attack due to the resolvement of your symptoms. You are at risk of having future episodes. Please continue to follow with a cardiologist and your primary medical doctor when you leave.      SECONDARY DISCHARGE DIAGNOSES  Diagnosis: Prediabetes  Assessment and Plan of Treatment: You were found to have an elevated A1c which is a marker for your blood glucose over time. Your A1c was 6.5 which means you are very close to requiring medicaiton for diabetes. You are still considered pre-diabetic. You are recommended to eat healthier and practice a daily exercise regiement. Exercise is a great way of controlling your glucose levels in your body. Please continue to follow with your primary care physician for further management of your prediabetes.    Diagnosis: Hypertensive emergency  Assessment and Plan of Treatment: You were found to have a high blood pressure in the preoperative room. You were given some zofran and tylenol which helped with some of your other symptoms. You do not need to take amlodipine. Please stop taking amlodipine. Your blood pressure lowered on its own and you were restarted on your home medication of lisinopril 2.5mg daily.   Continue taking your home medications as prescribed.    Diagnosis: CARLOS (acute kidney injury)  Assessment and Plan of Treatment: You were found to have an elevated marker for kidney injury. Your kidney function improved with adequate hydration. Please continue to monitor your kidney function with your primary medical doctor.     PRINCIPAL DISCHARGE DIAGNOSIS  Diagnosis: TIA (transient ischemic attack)  Assessment and Plan of Treatment: You presented to the hospital for outpatient Right Knee replacement.   You were about to have a procedure done however your blood pressure was significantly elevated and you were experiencing some new symptoms including a slight facial droop on the left side and some arm numbnes /weakness. You had a few imaging studies done including a CT Brain, CT Angogram as well as perfusion stusdies all negative for an acute stroke. You was seen and evalauated by Neurolgist Dr. Thompson recommended an MR Brain which was also negative for acute stroke. MR Brain did show some fullness in Mastoid and fluid in middle earand  MRI that were both negative for a ischemic stroke event, or thrombotic stroke event. However you were still deemed to have a transiet ischemic attack due to the resolvement of your symptoms. You are at risk of having future episodes. Please continue to follow with a cardiologist and your primary medical doctor when you leave.      SECONDARY DISCHARGE DIAGNOSES  Diagnosis: Prediabetes  Assessment and Plan of Treatment: You were found to have an elevated A1c which is a marker for your blood glucose over time. Your A1c was 6.5 which means you are very close to requiring medicaiton for diabetes. You are still considered pre-diabetic. You are recommended to eat healthier and practice a daily exercise regiement. Exercise is a great way of controlling your glucose levels in your body. Please continue to follow with your primary care physician for further management of your prediabetes.    Diagnosis: Mastoiditis of left side  Assessment and Plan of Treatment: You had an MRI done of the brain which showed an ear effusion on the left side. The effusion tells us, along with your current symptoms that you have an ear infection. Your infection is being treated with the antibiotic augmentin. Please continue taking Augmentin for 5 more days.    Diagnosis: Hypertensive emergency  Assessment and Plan of Treatment: You were found to have a high blood pressure in the preoperative room. You were given some zofran and tylenol which helped with some of your other symptoms. You do not need to take amlodipine. Please stop taking amlodipine. Your blood pressure lowered on its own and you were restarted on your home medication of lisinopril 2.5mg daily.   Continue taking your home medications as prescribed.    Diagnosis: CARLOS (acute kidney injury)  Assessment and Plan of Treatment: You were found to have an elevated marker for kidney injury. Your kidney function improved with adequate hydration. Please continue to monitor your kidney function with your primary medical doctor.     PRINCIPAL DISCHARGE DIAGNOSIS  Diagnosis: TIA (transient ischemic attack)  Assessment and Plan of Treatment: You presented to the hospital for outpatient Right Knee replacement.   You were about to have a procedure done however your blood pressure was significantly elevated and you were experiencing some new symptoms including a slight facial droop on the left side and some arm numbnes /weakness. You had a few imaging studies done including a CT Brain, CT Angogram as well as perfusion stusdies all negative for an acute stroke. You was seen and evalauated by Neurolgist Dr. Thompson recommended an MR Brain which was also negative for acute stroke. MR Brain did show some fullness in Mastoid and fluid in middle earand  MRI that were both negative for a ischemic stroke event, or thrombotic stroke event. However you were still deemed to have a transiet ischemic attack due to the resolvement of your symptoms. You are at risk of having future episodes. You were started on atorvstatin to control your cholesterol levels in your blood. You are at higher risk of cholesterol imbalance leading to coronary artery disease in the future. Please continue to take atorvastatin as prescribed. Also you were started on Aspirin 81mg daily. This is a baby dose and is recommended due for you in order to prevent platelet aggregation and in turn decrease risk of future stroke like events. Please continue to follow with a cardiologist and your primary medical doctor when you leave.      SECONDARY DISCHARGE DIAGNOSES  Diagnosis: Prediabetes  Assessment and Plan of Treatment: You were found to have an elevated A1c which is a marker for your blood glucose over time. Your A1c was 6.5 which means you are very close to requiring medicaiton for diabetes. You are still considered pre-diabetic. You are recommended to eat healthier and practice a daily exercise regiement. Exercise is a great way of controlling your glucose levels in your body. Please continue to follow with your primary care physician for further management of your prediabetes.    Diagnosis: Mastoiditis of left side  Assessment and Plan of Treatment: You had an MRI done of the brain which showed an ear effusion on the left side. The effusion tells us, along with your current symptoms that you have an ear infection. Your infection is being treated with the antibiotic augmentin. Please continue taking Augmentin for 8 more days. Take 1 tablet every 12 hours.  Please also follow up with an Ear, nose , throat (ENT) specialist. This is a severe infection that has to be followed up outpatient. We have included a doctor to follow up with, please either go to him or your own ENT.    Diagnosis: Hypertensive emergency  Assessment and Plan of Treatment: You were found to have a high blood pressure in the preoperative room. You were given some zofran and tylenol which helped with some of your other symptoms. You do not need to take amlodipine. Please stop taking amlodipine. Your blood pressure lowered on its own and you were restarted on your home medication of lisinopril 2.5mg daily.   Continue taking your home medications as prescribed.    Diagnosis: CARLOS (acute kidney injury)  Assessment and Plan of Treatment: You were found to have an elevated marker for kidney injury. Your kidney function improved with adequate hydration. Please continue to monitor your kidney function with your primary medical doctor.     PRINCIPAL DISCHARGE DIAGNOSIS  Diagnosis: TIA (transient ischemic attack)  Assessment and Plan of Treatment: You presented to the hospital for outpatient Right Knee replacement.   You were about to have a procedure done however your blood pressure was significantly elevated and you were experiencing some new symptoms including a slight facial droop on the left side and some arm numbnes /weakness. You had a few imaging studies done including a CT Brain, CT Angogram as well as perfusion stusdies all negative for an acute stroke. You was seen and evalauated by Neurolgist Dr. Thompson recommended an MR Brain which was also negative for acute stroke. MR Brain did show some fullness in Mastoid and fluid in middle earand  MRI that were both negative for a ischemic stroke event, or thrombotic stroke event. However you were still deemed to have a transiet ischemic attack due to the resolvement of your symptoms. You are at risk of having future episodes. You were started on atorvstatin to control your cholesterol levels in your blood. You are at higher risk of cholesterol imbalance leading to coronary artery disease in the future. Please continue to take atorvastatin as prescribed. Also you were started on Aspirin 81mg daily. This is a baby dose and is recommended due for you in order to prevent platelet aggregation and in turn decrease risk of future stroke like events. Please continue to follow with a cardiologist and your primary medical doctor when you leave.      SECONDARY DISCHARGE DIAGNOSES  Diagnosis: Hypertensive emergency  Assessment and Plan of Treatment: You were found to have a high blood pressure in the operative room. You were given some zofran and tylenol which helped with some of your other symptoms. You do not need to take amlodipine. Please stop taking amlodipine. Your blood pressure lowered on its own and you were restarted on your home medication of lisinopril 2.5mg daily.   Continue taking your home medications as prescribed.    Diagnosis: CARLOS (acute kidney injury)  Assessment and Plan of Treatment: You were found to have an elevated marker for kidney injury. Your kidney function improved with adequate hydration. Please continue to monitor your kidney function with your primary medical doctor.    Diagnosis: Prediabetes  Assessment and Plan of Treatment: You were found to have an elevated A1c which is a marker for your blood glucose over time. Your A1c was 6.5 which means you are very close to requiring medicaiton for diabetes. You are still considered pre-diabetic. You are recommended to eat healthier and practice a daily exercise regiement. Exercise is a great way of controlling your glucose levels in your body. Please continue to follow with your primary care physician for further management of your prediabetes.    Diagnosis: Mastoiditis of left side  Assessment and Plan of Treatment: You had an MRI done of the brain which showed an ear effusion on the left side. The effusion tells us, along with your current symptoms that you have an ear infection. Your infection is being treated with the antibiotic augmentin. Please continue taking Augmentin for 8 more days. Take 1 tablet every 12 hours.  Please also follow up with an Ear, nose , throat (ENT) specialist. This is a severe infection that has to be followed up outpatient. We have included a doctor to follow up with, please either go to him or your own ENT.     PRINCIPAL DISCHARGE DIAGNOSIS  Diagnosis: TIA (transient ischemic attack)  Assessment and Plan of Treatment: You presented to the hospital for outpatient Right Knee replacement.   You were about to have a procedure done however your blood pressure was significantly elevated and you were experiencing some new symptoms including a slight facial droop on the left side and some arm numbnes /weakness. You had a few imaging studies done including a CT Brain, CT Angogram as well as perfusion stusdies all negative for an acute stroke. You was seen and evalauated by Neurolgist Dr. Thompson recommended an MR Brain which was also negative for acute stroke. MR Brain did show some fullness in Mastoid and fluid in middle earand  MRI that were both negative for a ischemic stroke event, or thrombotic stroke event. However you were still deemed to have a transiet ischemic attack due to the resolvement of your symptoms. You are at risk of having future episodes. You were started on atorvstatin to control your cholesterol levels in your blood. You are at higher risk of cholesterol imbalance leading to coronary artery disease in the future. Please continue to take atorvastatin as prescribed. Also you were started on Aspirin 81mg daily. This is a baby dose and is recommended due for you in order to prevent platelet aggregation and in turn decrease risk of future stroke like events. Please continue to follow with a cardiologist and your primary medical doctor when you leave.      SECONDARY DISCHARGE DIAGNOSES  Diagnosis: Hypertensive emergency  Assessment and Plan of Treatment: You were found to have a high blood pressure in the operative room. You were given some zofran and tylenol which helped with some of your other symptoms for nausea and vomiting. We have adfjusted your Blood pressure medications.PLEASE INCREASE AMLODIPINE TO 5MG DAILY AND CONTINUE WITH LISINOPRIL 2.5 MG DAILY; IF BP ELEVATED MORE THAN 140/85 MM HG AS OUTPATIENT YOUR PCP MAY INCREASE LISINOPRIL AS TOLERATED TO TARGET BLOOD PRESSURE LESS THAN 130/80 MM HG PROVIDED KIDNEY FUNCTION REMAIN STABLE.    LOW SALT LOW FAT DIET  EXERCISE 20 TO 30MINS DAILY AT LEAST 3 TO 5 DAYS A WEEK AS TOLERATED AND WEIGHT LOSS WILL HELP BETTER CONTROL BLOOD PRESSURE    Diagnosis: CARLOS (acute kidney injury)  Assessment and Plan of Treatment: You were found to have an elevated marker for kidney injury. Your kidney function improved with adequate hydration and returned to baseline. Please continue to monitor your kidney function with your primary medical doctor.  You may have a mildly impaired kidney function CKD 2 at baseline.   Uncontrolled elevated blood pressure can cause Hypertensive kidney disease and Chronic Kidney disease.    Diagnosis: Prediabetes  Assessment and Plan of Treatment: You were found to have an elevated A1c which is a marker for your blood glucose over time. Your A1c was 6.5 which means you are very close to requiring medicaiton for diabetes. You are still considered pre-diabetic. You are recommended to eat healthier and practice a daily exercise regiement. Exercise is a great way of controlling your glucose levels in your body. Please continue to follow with your primary care physician for further management of your prediabetes.  Exercise and weight loss as advised as above    Diagnosis: Mastoiditis of left side  Assessment and Plan of Treatment: You had an MRI done of the brain which showed an ear effusion on the left side. The effusion tells us, along with your current symptoms that you may have an ear infection although no fever or elevated WBC count Your infection is being treated with the antibiotic augmentin. Please continue taking Augmentin for 8 more days. Take 1 tablet every 12 hours.  Please also follow up with an Ear, nose , throat (ENT) specialist. This is a severe infection that has to be followed up outpatient. We have included a doctor to follow up with, please either go to him or your own ENT.  You may call the office of Dr. Jaquez Cardiologist who may assist you to find an Ear specialist . You may follow up with Dr. Gaviria as per information provided at [facility] clinic.

## 2022-11-19 NOTE — PROGRESS NOTE ADULT - PROBLEM SELECTOR PLAN 1
p/w elevated BP, headache, dizziness, nausea  code stroke in PACU with NIHSS 2  ASA qd, high intensity statin qhs, plavix x21 days if true stroke  CTA H/N was neg  Tele monitoring  F/U Echo with bubble study  Pt not rTPA candidate due to: unknown time of onset  Monitor BP - allow permissive htn x24hr from last known normal  PT consult  Dysphagia screen: passed  Neuro checks q4  Neuro Consulted: Dr. Thompson  Cardio Consulted: Dr. Jaquez p/w elevated BP, headache, dizziness, nausea  code stroke in PACU with NIHSS 2  ASA qd, high intensity statin qhs, plavix x21 days if true stroke  CTA H/N was neg  MR Head (11/19) No acute infarcts, hemorrhage or mass. Left mastoid and middle ear effusion  Tele monitoring  F/U Echo with bubble study  Pt not rTPA candidate due to: unknown time of onset  Monitor BP - allow permissive htn x24hr from last known normal  PT consult  Dysphagia screen: passed  Due to no MR Head findings, less likely CVA will remove neuro checks and plavix  Continue ASA in light of possible TIA  Neuro Consulted: Dr. Thompson  Cardio Consulted: Dr. Jaquez

## 2022-11-19 NOTE — PROGRESS NOTE ADULT - PROBLEM SELECTOR PLAN 1
ASA qd, high intensity statin qhs, plavix x21 days if true stroke  CTA H/N was neg  MR Head (11/19) No acute infarcts, hemorrhage or mass. Left mastoid and middle ear effusion  Tele monitoring  F/U Echo with bubble study  Due to no MR Head findings, less likely CVA will remove neuro checks and plavix  Continue ASA in light of possible TIA

## 2022-11-19 NOTE — PROGRESS NOTE ADULT - SUBJECTIVE AND OBJECTIVE BOX
PATIENT SEEN AND EXAMINED ON :- 11/19/22  DATE OF SERVICE:  11/19/22           Interim events noted,Labs ,Radiological studies and Cardiology tests reviewed .         HOSPITAL COURSE: HPI:  Patient is a 60 y/o F, Kiswahili-speaking F from [place] w/ PMHx of HTN, OA. Patient is a poor historian. Collateral Hx was attempted from her son, Alex Newberry ([phone]) but he is not sure about patient's pharmacy and PCP. Patient is complaining of She was scheduled for elective R total knee replacement on 11/18/22. She did not take her medications on the day of admission. Pre-Op she was complaining of severe headache at the back of her head, nausea and dizziness. Her admission BP was 169/102 but went up to 215/111. The surgery was cancelled and patient was transferred to PACU for close monitoring. Medicine was consulted and patient was subsequently admitted. She continued to have above symptoms. Her BP was noted to be -160s. She received a dose of IV Zofran and Tylenol. She passed her dysphagia screen. Upon PE, patient was noted to be AAOx3, had some confusion and left lower facial droop w/ associated numbness. She also had weakness on her LUE (4/5). CODE STROKE was called. NIHSS was 2. Neurology (Dr. Thompson) was consulted. CTA H&N showed no acute infarct nor bleed. Patient was subsequently transferred to  for Telemetry monitoring.    GOC: FULL CODE (18 Nov 2022 13:33)      INTERIM EVENTS:Patient seen at bedside ,interim events noted.      PMH -reviewed admission note, no change since admission  HEART FAILURE: Acute[ ]Chronic[ ] Systolic[ ] Diastolic[ ] Combined Systolic and Diastolic[ ]  CAD[ ] CABG[ ] PCI[ ]  DEVICES[ ] PPM[ ] ICD[ ] ILR[ ]  ATRIAL FIBRILLATION[ ] Paroxysmal[ ] Permanent[ ] CHADS2-[  ]  CARLOS[ ] CKD1[ ] CKD2[ ] CKD3[ ] CKD4[ ] ESRD[ ]  COPD[ ] HTN[ ]   DM[ ] Type1[ ] Type 2[ ]   CVA[ ] Paresis[ ]    AMBULATION: Assisted[ ] Cane/walker[ ] Independent[ ]    MEDICATIONS  (STANDING):  aspirin  chewable 81 milliGRAM(s) Oral daily  atorvastatin 80 milliGRAM(s) Oral at bedtime  clopidogrel Tablet 75 milliGRAM(s) Oral daily  lisinopril 2.5 milliGRAM(s) Oral daily  meclizine 25 milliGRAM(s) Oral every 8 hours  sodium chloride 0.9% lock flush 3 milliLiter(s) IV Push every 8 hours    MEDICATIONS  (PRN):  acetaminophen     Tablet .. 650 milliGRAM(s) Oral every 6 hours PRN Mild Pain (1 - 3)  ondansetron Injectable 4 milliGRAM(s) IV Push every 8 hours PRN Nausea and/or Vomiting            REVIEW OF SYSTEMS:  Constitutional: [ ] fever, [ ]weight loss,  [ ]fatigue [ ]weight gain  Eyes: [ ] visual changes  Respiratory: [ ]shortness of breath;  [ ] cough, [ ]wheezing, [ ]chills, [ ]hemoptysis  Cardiovascular: [ ] chest pain, [ ]palpitations, [ ]dizziness,  [ ]leg swelling[ ]orthopnea[ ]PND  Gastrointestinal: [ ] abdominal pain, [ ]nausea, [ ]vomiting,  [ ]diarrhea [ ]Constipation [ ]Melena  Genitourinary: [ ] dysuria, [ ] hematuria [ ]Russell  Neurologic: [ ] headaches [ ] tremors[ ]weakness [ ]Paralysis Right[ ] Left[ ]  Skin: [ ] itching, [ ]burning, [ ] rashes  Endocrine: [ ] heat or cold intolerance  Musculoskeletal: [ ] joint pain or swelling; [ ] muscle, back, or extremity pain  Psychiatric: [ ] depression, [ ]anxiety, [ ]mood swings, or [ ]difficulty sleeping  Hematologic: [ ] easy bruising, [ ] bleeding gums    [ ] All remaining systems negative except as per above.   [ ]Unable to obtain.  [x] No change in ROS since admission      Vital Signs Last 24 Hrs  T(C): 36.7 (19 Nov 2022 19:43), Max: 36.8 (19 Nov 2022 13:07)  T(F): 98 (19 Nov 2022 19:43), Max: 98.3 (19 Nov 2022 13:07)  HR: 82 (19 Nov 2022 19:43) (61 - 82)  BP: 152/82 (19 Nov 2022 19:43) (131/77 - 164/90)  BP(mean): --  RR: 18 (19 Nov 2022 19:43) (18 - 18)  SpO2: 98% (19 Nov 2022 19:43) (95% - 100%)    Parameters below as of 19 Nov 2022 19:43  Patient On (Oxygen Delivery Method): room air      I&O's Summary      PHYSICAL EXAM:  General: No acute distress BMI-  HEENT: EOMI, PERRL  Neck: Supple, [ ] JVD  Lungs: Equal air entry bilaterally; [ ] rales [ ] wheezing [ ] rhonchi  Heart: Regular rate and rhythm; [x ] murmur   2/6 [ x] systolic [ ] diastolic [ ] radiation[ ] rubs [ ]  gallops  Abdomen: Nontender, bowel sounds present  Extremities: No clubbing, cyanosis, [ ] edema [ ]Pulses  equal and intact  Nervous system:  Alert & Oriented X3, no focal deficits  Psychiatric: Normal affect  Skin: No rashes or lesions    LABS:  11-19    145  |  111<H>  |  21<H>  ----------------------------<  94  3.8   |  29  |  1.14    Ca    9.0      19 Nov 2022 06:21    TPro  6.6  /  Alb  3.1<L>  /  TBili  0.3  /  DBili  x   /  AST  11  /  ALT  20  /  AlkPhos  73  11-18    Creatinine Trend: 1.14<--, 1.13<--, 1.46<--                        12.1   8.30  )-----------( 232      ( 18 Nov 2022 14:55 )             37.7     PT/INR - ( 18 Nov 2022 14:55 )   PT: 11.9 sec;   INR: 1.00 ratio         PTT - ( 18 Nov 2022 14:55 )  PTT:28.9 sec

## 2022-11-19 NOTE — PROGRESS NOTE ADULT - ATTENDING COMMENTS
Patient seen and examined in PACU with CODE STROKE TEAM; [place]  used: Armenian (Kiah) ID# [id number]    62 y/o Female, Armenian-speaking F from [place] w/ PMH of HTN, OA. Patient is a poor historian. PGY2 obtained Collateral Hx was attempted from her son, Alex Newberry ([phone]) but he is not sure about patient's pharmacy and PCP. Patient was scheduled for elective Right total knee replacement on 11/18/22. She did not take her medications on the day of admission as she she was NPO after midnight. Pre-Op she was complaining of severe headache at the back of her head, nausea and dizziness. Her admission BP was 169/102 but went up to 215/111. Patient got Versed in OR. The surgery was cancelled and patient was transferred to PACU for close monitoring. Medicine was consulted and patient was subsequently admitted.    On PGY2 assessment patient was noted ot have a facial droop (unclear if old or new). Code stroke called; Patient was AAOx 3, able to answer all questions appropriately. minor weakness noted on Left arm. No other neurological deficits noted.     patient doing well clinically; c/o severe left posterior occipital headache; also had nausea and vomit yesterday whole day as per patient; also ringing sensation left ear; Review of outpt meds on Meclizine PRN; no nausea or vomit today.   denies fever, chills, SOB, palpitations, chest pain, n, diarrhea, constipation, dizziness      Vital Signs Last 24 Hrs: reviewed stable; mildly elevated BP; stable HR; afebrile    P/E:  Psych: AAO x3; stable mood  Neuro: No gross focal deficits; Power and sensation intact; mild left arm weakness and slight facial droop (old vs new)  CVS: S1S2 present, regular, no edema  Resp: BLAE+, No wheeze or Rhonchi  GI: Soft, BS+, Non tender, non distended  Extr: No  calf tenderness B/L Lower extremities  Skin: Warm and moist without any rashes    Labs: Reviewed            CT Angio Head w/ IV Cont (11.18.22 @ 13:02)     ACC: [id number] EXAM:  CT PERFUSION W MAPS IC                        ACC: [id number] EXAM:  CT ANGIO BRAIN (W)AW IC                        ACC: [id number] EXAM:  CT BRAIN STROKE PROTOCOL                        ACC: [id number] EXAM:  CT ANGIO NECK (W)AW IC                      PROCEDURE DATE:  11/18/2022      IMPRESSION:  *  HEAD CT DEGRADED BY MOTION. NO INTRACRANIAL HEMORRHAGE, MIDLINE SHIFT, OR HYDROCEPHALUS.  *  NO HEMODYNAMIC SIGNIFICANT NARROWING WITHIN THE NECK.  *  NO PROXIMAL LARGE VESSEL OCCLUSION INTRACRANIALLY.  *  NO AREAS OF ISCHEMIA IDENTIFIED ON PERFUSION IMAGING.    D/D:  HTN urgency improved  Facial droop and left arm weakness concerning for CVA, although less likely  CARLOS possibly HTN kidney disease  Hx OA Right knee    Plan:  Continue Telemetry rule out arrhythmia;   ASA/ Plavix/ High dose statin; 2D Echo with bubble study pending  Add Meclizine 25 mg q 8hrs routine x 24 hrs; ENT as outpatient  CT Brain stroke protocol and CT Angio negative  Neuro consult appreciate; d/w Dr. Thompson; recommend MR Brain  Cardio Consult Dr. Jaqeuz  Permissive HTN for 24 hrs completed; Resume Lisinopril today; if BP elevated AM first increase Lisinopril to 5 mg daily  If SBP elevated more than 160 mm Hg  Monitor renal fn closely  DVT ppx    Discussed with patient findings and plan of care with Armenian  as above  PGY1 updated family; get more information about pharmacy and home meds  Discussed with PGY1 Dr. Zuniga; possible D/C home AM if MR negative and Headache resolved

## 2022-11-19 NOTE — PHYSICAL THERAPY INITIAL EVALUATION ADULT - GAIT DEVIATIONS NOTED, PT EVAL
decreased adonis/increased time in double stance/decreased velocity of limb motion/decreased step length/decreased stride length

## 2022-11-19 NOTE — PHYSICAL THERAPY INITIAL EVALUATION ADULT - GENERAL OBSERVATIONS, REHAB EVAL
Pt received supine in bed w/telemetry, (+) mild facial droop, c/o headache (occipital area) PS 5/10. [facility]  #[id number] used during assessment

## 2022-11-19 NOTE — DISCHARGE NOTE PROVIDER - CARE PROVIDER_API CALL
Woodrow Gaviria)  Otolaryngology  [address] [place] [address], [place] [address]  Phone: [phone]  Fax: [phone]  Follow Up Time:    Woodrow Gaviria)  Otolaryngology  [address] [facility], [place] [address]  Phone: [phone]  Fax: [phone]  Follow Up Time:     Donovan Jaquez)  Cardiology  [address] [place] [address]  Phone: [phone]  Fax: [phone]  Follow Up Time: 2 weeks

## 2022-11-19 NOTE — DISCHARGE NOTE PROVIDER - HOSPITAL COURSE
Patient is a 62 y/o F, German-speaking F from [place] w/ PMHx of HTN, OA. Patient is a poor historian. Collateral Hx was attempted from her son, Alex Newberry ([phone]). Patient was scheduled for elective R total knee replacement on 11/18/22. Pre-Op she was complaining of severe headache at the back of her head, nausea and dizziness. Her admission BP was 169/102 but went up to 215/111. The surgery was cancelled and patient was transferred to PACU for close monitoring. Medicine was consulted and patient was subsequently admitted. She continued to have above symptoms. Her BP was noted to be -160s. She received a dose of IV Zofran and Tylenol. She passed her dysphagia screen. Upon physical exam, patient was noted to be AAOx3, had some confusion and left lower facial droop w/ associated numbness. She also had weakness on her LUE (4/5). CODE STROKE was called. NIHSS was 2. Neurology (Dr. Thompson) was consulted. CTA H&N showed no acute infarct nor bleed. Patient was subsequently transferred to  for Telemetry monitoring. MR Head (11/19) showed a few nonspecific white matter changes. No acute infarcts, hemorrhage or mass. Left mastoid and middle ear effusion. Pt was evaluated by a neurologist. Pt was deemed to have suffered from a TIA. Pt will continue with ASA and will no longer require Plavix. Pt hypertension was controlled with home medication lisinopril 2.5mg daily. Pt acute kidney injury resolved after adequate hydration while inpatient.     Given patient's improved clinical status and current hemodynamic stability, decision was made to discharge. Discussed with attending. Please refer to patient's complete medical chart with documents for a full hospital course, for this is only a brief summary.   Patient is a 62 y/o F, English-speaking F from [place] w/ PMHx of HTN, OA. Patient is a poor historian. Collateral Hx was attempted from her son, Alex Newberry ([phone]). Patient was scheduled for elective R total knee replacement on 11/18/22. Pre-Op she was complaining of severe headache at the back of her head, nausea and dizziness. Her admission BP was 169/102 but went up to 215/111. The surgery was cancelled and patient was transferred to PACU for close monitoring. Medicine was consulted and patient was subsequently admitted. She continued to have above symptoms. Her BP was noted to be -160s. She received a dose of IV Zofran and Tylenol. She passed her dysphagia screen. Upon physical exam, patient was noted to be AAOx3, had some confusion and left lower facial droop w/ associated numbness. She also had weakness on her LUE (4/5). CODE STROKE was called. NIHSS was 2. Neurology (Dr. Thompson) was consulted. CTA H&N showed no acute infarct nor bleed. Patient was subsequently transferred to  for Telemetry monitoring. MR Head (11/19) showed a few nonspecific white matter changes. No acute infarcts, hemorrhage or mass. Left mastoid and middle ear effusion. Middle ear effusion was managed with  antibitoics, which will be continued at home for 5 more days. Patient recommended to follow up with ENT specialist for further evaluation outpatient. Pt was evaluated by a neurologist. Pt was deemed to have suffered from a TIA. Pt will continue with ASA and will no longer require Plavix. Pt hypertension was controlled with home medication lisinopril 2.5mg daily. Home medications amlodipine was discontinued, due to well controlled BP on Lisinopril. Pt can increase dose outpatient with primary medical doctor reevaluation as needed. Pt acute kidney injury resolved after adequate hydration while inpatient.     Given patient's improved clinical status and current hemodynamic stability, decision was made to discharge. Discussed with attending. Please refer to patient's complete medical chart with documents for a full hospital course, for this is only a brief summary.   Patient is a 62 y/o F, Welsh-speaking F from [place] w/ PMHx of HTN, OA. Patient is a poor historian. Collateral Hx was attempted from her son, Alex Newberry ([phone]). Patient was scheduled for elective R total knee replacement on 11/18/22. Pre-Op she was complaining of severe headache at the back of her head, nausea and dizziness. Her admission BP was 169/102 but went up to 215/111. The surgery was cancelled and patient was transferred to PACU for close monitoring. Medicine was consulted and patient was subsequently admitted. She continued to have above symptoms. Her BP was noted to be -160s. She received a dose of IV Zofran and Tylenol. She passed her dysphagia screen. Upon physical exam, patient was noted to be AAOx3, had some confusion and left lower facial droop w/ associated numbness. She also had weakness on her LUE (4/5). CODE STROKE was called. NIHSS was 2. Neurology (Dr. Thompson) was consulted. CTA H&N showed no acute infarct nor bleed. Patient was subsequently transferred to  for Telemetry monitoring. MR Head (11/19) showed a few nonspecific white matter changes. No acute infarcts, hemorrhage or mass. Left mastoid and middle ear effusion. Middle ear effusion was managed with  antibitoics, which will be continued at home for 8 more days. Patient recommended to follow up with ENT specialist for further evaluation outpatient. Pt was evaluated by a neurologist. Pt was deemed to have suffered from a TIA. Pt will continue with ASA and will no longer require Plavix. Pt hypertension was controlled with home medication lisinopril 2.5mg daily. Home medications amlodipine was discontinued, due to well controlled BP on Lisinopril. Pt can increase dose outpatient with primary medical doctor reevaluation as needed. Pt acute kidney injury resolved after adequate hydration while inpatient. Pt prescribed medium intensity statin and aspirin.    Given patient's improved clinical status and current hemodynamic stability, decision was made to discharge. Discussed with attending. Please refer to patient's complete medical chart with documents for a full hospital course, for this is only a brief summary.

## 2022-11-19 NOTE — DISCHARGE NOTE PROVIDER - NSDCMRMEDTOKEN_GEN_ALL_CORE_FT
alendronate 70 mg oral tablet: 1 tab(s) orally once a week  amLODIPine 2.5 mg oral tablet: 1 tab(s) orally once a day  Augmentin 875 mg-125 mg oral tablet: 1 tab(s) orally every 12 hours x 10 days  Calcium 600+D oral tablet: 1 tab(s) orally 2 times a day  ibuprofen 400 mg oral tablet: 1 tab(s) orally every 8 hours, As Needed  lisinopril 2.5 mg oral tablet: 1 tab(s) orally once a day  meclizine 25 mg oral tablet: 1 tab(s) orally 3 times a day  MethylPREDNISolone Dose Pack 4 mg oral tablet: tab(s) orally once a day  Nephplex Rx oral tablet: 1 tab(s) orally once a day  ofloxacin 0.3% otic solution: 5 drop(s) to each affected ear 2 times a day  Tylenol 500 mg oral tablet: 2 tab(s) orally every 6 hours, As Needed  Vitamin D3 50 mcg (2000 intl units) oral tablet: 1 tab(s) orally once a day   alendronate 70 mg oral tablet: 1 tab(s) orally once a week  amoxicillin-clavulanate 875 mg-125 mg oral tablet: 1 tab(s) orally 2 times a day  aspirin 81 mg oral tablet, chewable: 1 tab(s) orally once a day  atorvastatin 40 mg oral tablet: 1 tab(s) orally once a day   ibuprofen 400 mg oral tablet: 1 tab(s) orally every 8 hours, As Needed  lisinopril 2.5 mg oral tablet: 1 tab(s) orally once a day  meclizine 25 mg oral tablet: 1 tab(s) orally 3 times a day   alendronate 70 mg oral tablet: 1 tab(s) orally once a week  amLODIPine 5 mg oral tablet: 1 tab(s) orally once a day   amoxicillin-clavulanate 875 mg-125 mg oral tablet: 1 tab(s) orally 2 times a day  aspirin 81 mg oral tablet, chewable: 1 tab(s) orally once a day  atorvastatin 40 mg oral tablet: 1 tab(s) orally once a day   ibuprofen 400 mg oral tablet: 1 tab(s) orally every 8 hours, As Needed  lisinopril 2.5 mg oral tablet: 1 tab(s) orally once a day  meclizine 25 mg oral tablet: 1 tab(s) orally 3 times a day

## 2022-11-19 NOTE — PHYSICAL THERAPY INITIAL EVALUATION ADULT - CRITERIA FOR SKILLED THERAPEUTIC INTERVENTIONS
home with home PT/impairments found/functional limitations in following categories/risk reduction/prevention/anticipated discharge recommendation

## 2022-11-20 DIAGNOSIS — Z29.9 ENCOUNTER FOR PROPHYLACTIC MEASURES, UNSPECIFIED: ICD-10-CM

## 2022-11-20 DIAGNOSIS — H70.92 UNSPECIFIED MASTOIDITIS, LEFT EAR: ICD-10-CM

## 2022-11-20 LAB
ALBUMIN SERPL ELPH-MCNC: 2.8 G/DL — LOW (ref 3.5–5)
ALP SERPL-CCNC: 69 U/L — SIGNIFICANT CHANGE UP (ref 40–120)
ALT FLD-CCNC: 22 U/L DA — SIGNIFICANT CHANGE UP (ref 10–60)
ANION GAP SERPL CALC-SCNC: 3 MMOL/L — LOW (ref 5–17)
AST SERPL-CCNC: 11 U/L — SIGNIFICANT CHANGE UP (ref 10–40)
BASOPHILS # BLD AUTO: 0.04 K/UL — SIGNIFICANT CHANGE UP (ref 0–0.2)
BASOPHILS NFR BLD AUTO: 0.6 % — SIGNIFICANT CHANGE UP (ref 0–2)
BILIRUB SERPL-MCNC: 0.3 MG/DL — SIGNIFICANT CHANGE UP (ref 0.2–1.2)
BUN SERPL-MCNC: 25 MG/DL — HIGH (ref 7–18)
CALCIUM SERPL-MCNC: 9.1 MG/DL — SIGNIFICANT CHANGE UP (ref 8.4–10.5)
CHLORIDE SERPL-SCNC: 112 MMOL/L — HIGH (ref 96–108)
CO2 SERPL-SCNC: 30 MMOL/L — SIGNIFICANT CHANGE UP (ref 22–31)
CREAT SERPL-MCNC: 1.4 MG/DL — HIGH (ref 0.5–1.3)
EGFR: 43 ML/MIN/1.73M2 — LOW
EOSINOPHIL # BLD AUTO: 0.16 K/UL — SIGNIFICANT CHANGE UP (ref 0–0.5)
EOSINOPHIL NFR BLD AUTO: 2.5 % — SIGNIFICANT CHANGE UP (ref 0–6)
GLUCOSE SERPL-MCNC: 116 MG/DL — HIGH (ref 70–99)
HCT VFR BLD CALC: 37.2 % — SIGNIFICANT CHANGE UP (ref 34.5–45)
HGB BLD-MCNC: 11.9 G/DL — SIGNIFICANT CHANGE UP (ref 11.5–15.5)
IMM GRANULOCYTES NFR BLD AUTO: 0.3 % — SIGNIFICANT CHANGE UP (ref 0–0.9)
LYMPHOCYTES # BLD AUTO: 2.54 K/UL — SIGNIFICANT CHANGE UP (ref 1–3.3)
LYMPHOCYTES # BLD AUTO: 39.8 % — SIGNIFICANT CHANGE UP (ref 13–44)
MAGNESIUM SERPL-MCNC: 2.1 MG/DL — SIGNIFICANT CHANGE UP (ref 1.6–2.6)
MCHC RBC-ENTMCNC: 28.2 PG — SIGNIFICANT CHANGE UP (ref 27–34)
MCHC RBC-ENTMCNC: 32 GM/DL — SIGNIFICANT CHANGE UP (ref 32–36)
MCV RBC AUTO: 88.2 FL — SIGNIFICANT CHANGE UP (ref 80–100)
MONOCYTES # BLD AUTO: 0.58 K/UL — SIGNIFICANT CHANGE UP (ref 0–0.9)
MONOCYTES NFR BLD AUTO: 9.1 % — SIGNIFICANT CHANGE UP (ref 2–14)
NEUTROPHILS # BLD AUTO: 3.04 K/UL — SIGNIFICANT CHANGE UP (ref 1.8–7.4)
NEUTROPHILS NFR BLD AUTO: 47.7 % — SIGNIFICANT CHANGE UP (ref 43–77)
NRBC # BLD: 0 /100 WBCS — SIGNIFICANT CHANGE UP (ref 0–0)
PHOSPHATE SERPL-MCNC: 3.6 MG/DL — SIGNIFICANT CHANGE UP (ref 2.5–4.5)
PLATELET # BLD AUTO: 253 K/UL — SIGNIFICANT CHANGE UP (ref 150–400)
POTASSIUM SERPL-MCNC: 4.2 MMOL/L — SIGNIFICANT CHANGE UP (ref 3.5–5.3)
POTASSIUM SERPL-SCNC: 4.2 MMOL/L — SIGNIFICANT CHANGE UP (ref 3.5–5.3)
PROT SERPL-MCNC: 6.6 G/DL — SIGNIFICANT CHANGE UP (ref 6–8.3)
RBC # BLD: 4.22 M/UL — SIGNIFICANT CHANGE UP (ref 3.8–5.2)
RBC # FLD: 11.9 % — SIGNIFICANT CHANGE UP (ref 10.3–14.5)
SODIUM SERPL-SCNC: 145 MMOL/L — SIGNIFICANT CHANGE UP (ref 135–145)
WBC # BLD: 6.38 K/UL — SIGNIFICANT CHANGE UP (ref 3.8–10.5)
WBC # FLD AUTO: 6.38 K/UL — SIGNIFICANT CHANGE UP (ref 3.8–10.5)

## 2022-11-20 PROCEDURE — 99233 SBSQ HOSP IP/OBS HIGH 50: CPT | Mod: GC

## 2022-11-20 RX ORDER — SODIUM CHLORIDE 9 MG/ML
1000 INJECTION, SOLUTION INTRAVENOUS
Refills: 0 | Status: DISCONTINUED | OUTPATIENT
Start: 2022-11-20 | End: 2022-11-21

## 2022-11-20 RX ORDER — AMLODIPINE BESYLATE 2.5 MG/1
5 TABLET ORAL DAILY
Refills: 0 | Status: DISCONTINUED | OUTPATIENT
Start: 2022-11-20 | End: 2022-11-21

## 2022-11-20 RX ADMIN — SODIUM CHLORIDE 3 MILLILITER(S): 9 INJECTION INTRAMUSCULAR; INTRAVENOUS; SUBCUTANEOUS at 05:59

## 2022-11-20 RX ADMIN — ATORVASTATIN CALCIUM 80 MILLIGRAM(S): 80 TABLET, FILM COATED ORAL at 21:34

## 2022-11-20 RX ADMIN — AMLODIPINE BESYLATE 5 MILLIGRAM(S): 2.5 TABLET ORAL at 13:02

## 2022-11-20 RX ADMIN — Medication 1 TABLET(S): at 19:05

## 2022-11-20 RX ADMIN — LISINOPRIL 2.5 MILLIGRAM(S): 2.5 TABLET ORAL at 06:01

## 2022-11-20 RX ADMIN — Medication 25 MILLIGRAM(S): at 06:01

## 2022-11-20 RX ADMIN — SODIUM CHLORIDE 3 MILLILITER(S): 9 INJECTION INTRAMUSCULAR; INTRAVENOUS; SUBCUTANEOUS at 00:40

## 2022-11-20 RX ADMIN — Medication 81 MILLIGRAM(S): at 13:12

## 2022-11-20 RX ADMIN — SODIUM CHLORIDE 3 MILLILITER(S): 9 INJECTION INTRAMUSCULAR; INTRAVENOUS; SUBCUTANEOUS at 21:34

## 2022-11-20 RX ADMIN — CLOPIDOGREL BISULFATE 75 MILLIGRAM(S): 75 TABLET, FILM COATED ORAL at 13:12

## 2022-11-20 RX ADMIN — Medication 1 TABLET(S): at 13:02

## 2022-11-20 RX ADMIN — SODIUM CHLORIDE 75 MILLILITER(S): 9 INJECTION, SOLUTION INTRAVENOUS at 17:56

## 2022-11-20 RX ADMIN — SODIUM CHLORIDE 3 MILLILITER(S): 9 INJECTION INTRAMUSCULAR; INTRAVENOUS; SUBCUTANEOUS at 17:56

## 2022-11-20 NOTE — PROGRESS NOTE ADULT - PROBLEM SELECTOR PLAN 1
Monitor BP - d/c permissive htn x24hr from last known normal  cont amlodipine 5mg PO qd  c/wcont Due to no MR Head findings, less likely CVA will remove neuro checks and plavix  Continue ASA  Neuro f/u

## 2022-11-20 NOTE — PROGRESS NOTE ADULT - SUBJECTIVE AND OBJECTIVE BOX
PGY-1 Progress Note discussed with attending    PAGER #: [[phone]] TILL 5:00 PM  PLEASE CONTACT ON CALL TEAM:  - On Call Team (Please refer to Lexi) FROM 5:00 PM - 8:30PM  - Nightfloat Team FROM 8:30 -7:30 AM    CC: Patient is a 61y old  Female who presents with a chief complaint of HTN emergency (r/o CVA) (2022 20:00)      OVERNIGHT EVENTS: none    SUBJECTIVE / INTERVAL HPI: Patient seen and examined at bedside. Complains of left ear congestion, with  pain and hearing loss, along with headache and some feelings of room spinning sensation associated with nausea and vomiting.     ROS: negative unless otherwise stated above.    VITAL SIGNS:  Vital Signs Last 24 Hrs  T(C): 36.9 (2022 08:14), Max: 37.3 (2022 23:32)  T(F): 98.5 (2022 08:14), Max: 99.1 (2022 23:32)  HR: 68 (2022 08:14) (68 - 98)  BP: 173/89 (2022 08:14) (131/77 - 173/89)  BP(mean): --  RR: 18 (2022 08:14) (18 - 18)  SpO2: 98% (2022 08:14) (95% - 99%)    Parameters below as of 2022 08:14  Patient On (Oxygen Delivery Method): room air        PHYSICAL EXAM:    General: WDWN  HEENT: NC/AT; PERRL, anicteric sclera; MMM, +pain to percussion of L mastoid  Neck: supple  Cardiovascular: +S1/S2; RRR  Respiratory: CTA B/L; no W/R/R  Gastrointestinal: soft, NT/ND; +BSx4  Extremities: WWP; no edema, clubbing or cyanosis  Vascular: 2+ radial, DP/PT pulses B/L  Skin: Warm, dry, good turgor, no rashes  Neurological: AAOx3; no focal deficits, equal strength throughout     MEDICATIONS:  MEDICATIONS  (STANDING):  amLODIPine   Tablet 5 milliGRAM(s) Oral daily  aspirin  chewable 81 milliGRAM(s) Oral daily  atorvastatin 80 milliGRAM(s) Oral at bedtime  clopidogrel Tablet 75 milliGRAM(s) Oral daily  lisinopril 2.5 milliGRAM(s) Oral daily  sodium chloride 0.45%. 1000 milliLiter(s) (75 mL/Hr) IV Continuous <Continuous>  sodium chloride 0.9% lock flush 3 milliLiter(s) IV Push every 8 hours    MEDICATIONS  (PRN):  acetaminophen     Tablet .. 650 milliGRAM(s) Oral every 6 hours PRN Mild Pain (1 - 3)  ondansetron Injectable 4 milliGRAM(s) IV Push every 8 hours PRN Nausea and/or Vomiting      ALLERGIES:  Allergies    No Known Allergies    Intolerances        LABS:                        11.9   6.38  )-----------( 253      ( 2022 06:56 )             37.2         145  |  112<H>  |  25<H>  ----------------------------<  116<H>  4.2   |  30  |  1.40<H>    Ca    9.1      2022 06:56  Phos  3.6       Mg     2.1         TPro  6.6  /  Alb  2.8<L>  /  TBili  0.3  /  DBili  x   /  AST  11  /  ALT  22  /  AlkPhos  69      PT/INR - ( 2022 14:55 )   PT: 11.9 sec;   INR: 1.00 ratio         PTT - ( 2022 14:55 )  PTT:28.9 sec  Urinalysis Basic - ( 2022 14:55 )    Color: Yellow / Appearance: Clear / S.010 / pH: x  Gluc: x / Ketone: Negative  / Bili: Negative / Urobili: Negative   Blood: x / Protein: 15 / Nitrite: Negative   Leuk Esterase: Negative / RBC: 0-2 /HPF / WBC 0-2 /HPF   Sq Epi: x / Non Sq Epi: Few /HPF / Bacteria: Trace /HPF      CAPILLARY BLOOD GLUCOSE      POCT Blood Glucose.: 93 mg/dL (2022 12:23)      RADIOLOGY & ADDITIONAL TESTS:   < from: MR Head No Cont (22 @ 15:14) >  NTERPRETATION:  CLINICAL INDICATION:  Headache, nausea and dizziness,   hypertensive crisis      Magnetic resonance imaging of the brain was carried out with transaxial   SPGR, FLAIR, fast spin echo T2 weighted images, axial susceptibility   weighted series, diffusion weighted series and sagittal T1 weighted   series on a 1.5 Rowena magnet.    Comparison is made with the prior CT/CTA of 2022.    The fourth, third and lateral ventricles are normal in size and position.   There is no hemorrhage, mass or shift of the midline structures. Minimal   small vessel white matter ischemic changes are noted. No acute infarcts   are seen. There is no old ornew hemorrhage. There is a left mastoid and   middle ear effusion. The sellar and parasellar structures are   unremarkable. The paranasal sinuses are clear.      IMPRESSION: A few nonspecific white matter changes. No acute infarcts,   hemorrhage or mass. Left mastoid and middle ear effusion.    < end of copied text >

## 2022-11-20 NOTE — PROGRESS NOTE ADULT - ATTENDING COMMENTS
Patient seen and examined this morning around 10.30 AM; [place]  used: Divehi (Zeng) ID# [id number]    60 y/o Female, Divehi-speaking Female from [place] w/ PMH of HTN, OA.. Patient was scheduled for elective Right total knee replacement on 11/18/22. She did not take her medications on the day of admission as she she was NPO after midnight. Pre-Op she was complaining of severe headache at the back of her head, nausea and dizziness. Her admission BP was 169/102 but went up to 215/111. Patient got Versed in OR. The surgery was cancelled and patient was transferred to PACU for close monitoring. Medicine was consulted and patient was subsequently admitted.  On PGY2 assessment patient was noted ot have a facial droop (unclear if old or new). Code stroke called; Patient was AAOx 3, able to answer all questions appropriately. minor weakness noted on Left arm. No other neurological deficits noted.   CT Angio and CT Brain negative for CVA; patient seen by Neuro advised MR Brain.       patient doing well clinically; Headache improved as per patient; noted to have no acute stroke on MR but incidentally noted fluid in mastoid air cells  No nausea and vomit since last 24 hrs. denies fever, chills, SOB, palpitations, chest pain, n, diarrhea, constipation, dizziness    Vital Signs Last 24 Hrs  T(C): 36.7 (20 Nov 2022 15:21), Max: 37.3 (19 Nov 2022 23:32)  T(F): 98.1 (20 Nov 2022 15:21), Max: 99.1 (19 Nov 2022 23:32)  HR: 90 (20 Nov 2022 15:21) (68 - 98)  BP: 154/91 (20 Nov 2022 15:21) (133/66 - 173/89)  RR: 18 (20 Nov 2022 15:21) (18 - 18)  SpO2: 96% (20 Nov 2022 15:21) (96% - 99%): room air      P/E:  Psych: AAO x3; stable mood  Neuro: No gross focal deficits; Power and sensation intact; mild left arm weakness and slight facial droop (old vs new)  CVS: S1S2 present, regular, no edema  Resp: BLAE+, No wheeze or Rhonchi  GI: Soft, BS+, Non tender, non distended  Extr: No  calf tenderness B/L Lower extremities  Skin: Warm and moist without any rashes    Labs:                       11.9   6.38  )-----------( 253      ( 20 Nov 2022 06:56 )             37.2   11-20    145  |  112<H>  |  25<H>  ----------------------------<  116<H>  4.2   |  30  |  1.40<H>  Ca    9.1      20 Nov 2022 06:56  Phos  3.6     11-20  Mg     2.1     11-20  TPro  6.6  /  Alb  2.8<L>  /  TBili  0.3  /  DBili  x   /  AST  11  /  ALT  22  /  AlkPhos  69  11-20           CT Angio Head w/ IV Cont (11.18.22 @ 13:02)     ACC: [id number] EXAM:  CT PERFUSION W MAPS IC                        ACC: [id number] EXAM:  CT ANGIO BRAIN (W)AW IC                        ACC: [id number] EXAM:  CT BRAIN STROKE PROTOCOL                        ACC: [id number] EXAM:  CT ANGIO NECK (W)AW IC                      PROCEDURE DATE:  11/18/2022      IMPRESSION:  *  HEAD CT DEGRADED BY MOTION. NO INTRACRANIAL HEMORRHAGE, MIDLINE SHIFT, OR HYDROCEPHALUS.  *  NO HEMODYNAMIC SIGNIFICANT NARROWING WITHIN THE NECK.  *  NO PROXIMAL LARGE VESSEL OCCLUSION INTRACRANIALLY.  *  NO AREAS OF ISCHEMIA IDENTIFIED ON PERFUSION IMAGING.    MR Head No Cont (11.19.22 @ 15:14)   IMPRESSION: A few nonspecific white matter changes. No acute infarcts, hemorrhage or mass. Left mastoid and middle ear effusion.    D/D:  HTN urgency improved  Facial droop and left arm weakness concerning for CVA, although less likely  CARLOS possibly HTN kidney disease  Hx OA Right knee    Plan:  Continue Telemetry rule out arrhythmia;   ASA/ Plavix/ High dose statin; 2D Echo with bubble study pending  Add Meclizine 25 mg q 8hrs routine x 24 hrs; ENT as outpatient  CT Brain stroke protocol and CT Angio negative  Neuro consult appreciate; d/w Dr. Thompson; recommend MR Brain  Cardio Consult Dr. Jaquez  Permissive HTN for 24 hrs completed; Resume Lisinopril today; if BP elevated AM first increase Lisinopril to 5 mg daily  If SBP elevated more than 160 mm Hg  Monitor renal fn closely  DVT ppx    Discussed with patient findings and plan of care with Divehi  as above  PGY1 updated family; get more information about pharmacy and home meds  Discussed with PGY1 Dr. Zuniga; possible D/C home AM if MR negative and Headache resolved Patient seen and examined this morning around 10.30 AM; [place]  used: Italian (Zeng) ID# [id number]    62 y/o Female, Italian-speaking Female from [place] w/ PMH of HTN, OA.. Patient was scheduled for elective Right total knee replacement on 11/18/22. She did not take her medications on the day of admission as she she was NPO after midnight. Pre-Op she was complaining of severe headache at the back of her head, nausea and dizziness. Her admission BP was 169/102 but went up to 215/111. Patient got Versed in OR. The surgery was cancelled and patient was transferred to PACU for close monitoring. Medicine was consulted and patient was subsequently admitted.  On PGY2 assessment patient was noted ot have a facial droop (unclear if old or new). Code stroke called; Patient was AAOx 3, able to answer all questions appropriately. minor weakness noted on Left arm. No other neurological deficits noted.   CT Angio and CT Brain negative for CVA; patient seen by Neuro advised MR Brain.       patient doing well clinically; Headache improved as per patient; noted to have no acute stroke on MR but incidentally noted fluid in mastoid air cells  No nausea and vomit since last 24 hrs. denies fever, chills, SOB, palpitations, chest pain, n, diarrhea, constipation, dizziness    Vital Signs Last 24 Hrs  T(C): 36.7 (20 Nov 2022 15:21), Max: 37.3 (19 Nov 2022 23:32)  T(F): 98.1 (20 Nov 2022 15:21), Max: 99.1 (19 Nov 2022 23:32)  HR: 90 (20 Nov 2022 15:21) (68 - 98)  BP: 154/91 (20 Nov 2022 15:21) (133/66 - 173/89)  RR: 18 (20 Nov 2022 15:21) (18 - 18)  SpO2: 96% (20 Nov 2022 15:21) (96% - 99%): room air      P/E:  Psych: AAO x3; stable mood  Neuro: No gross focal deficits; Power and sensation intact; no obvious motor or sensory deficit appreciated  CVS: S1S2 present, regular, no edema  Resp: BLAE+, No wheeze or Rhonchi  GI: Soft, BS+, Non tender, non distended  Extr: No  calf tenderness B/L Lower extremities  Skin: Warm and moist without any rashes    Labs:                       11.9   6.38  )-----------( 253      ( 20 Nov 2022 06:56 )             37.2   11-20  145  |  112<H>  |  25<H>  ----------------------------<  116<H>  4.2   |  30  |  1.40<H>  Ca    9.1      20 Nov 2022 06:56  Phos  3.6     11-20  Mg     2.1     11-20  TPro  6.6  /  Alb  2.8<L>  /  TBili  0.3  /  DBili  x   /  AST  11  /  ALT  22  /  AlkPhos  69  11-20           ACC: [id number] EXAM:  CT PERFUSION W MAPS IC                        ACC: [id number] EXAM:  CT ANGIO BRAIN (W)AW IC                        ACC: [id number] EXAM:  CT BRAIN STROKE PROTOCOL                        ACC: [id number] EXAM:  CT ANGIO NECK (W) IC                      PROCEDURE DATE:  11/18/2022      IMPRESSION:  *  HEAD CT DEGRADED BY MOTION. NO INTRACRANIAL HEMORRHAGE, MIDLINE SHIFT, OR HYDROCEPHALUS.  *  NO HEMODYNAMIC SIGNIFICANT NARROWING WITHIN THE NECK.  *  NO PROXIMAL LARGE VESSEL OCCLUSION INTRACRANIALLY.  *  NO AREAS OF ISCHEMIA IDENTIFIED ON PERFUSION IMAGING.    MR Head No Cont (11.19.22 @ 15:14)   IMPRESSION: A few nonspecific white matter changes. No acute infarcts, hemorrhage or mass. Left mastoid and middle ear effusion.    D/D:  HTN urgency improved  suspected Left Mastoiditis  Facial droop and left arm weakness concerning for CVA, ruled out possible TIA  CARLOS possibly HTN kidney disease plus possibly prerenal from recent vomiting  Hx OA Right knee    Plan:  Given findings of possible Mastoiditis and patient symptom of ear ringing sensation and vomiting will add Augmentin 875 mg BID x10 days total although no fever or leucocytosis. will refer ENT as outpatient  CT Brain stroke protocol and CT Angio and perfusion studies negative; MR negative; No acute CVA; Echo still pending read  d/w Dr. Thompson; possible TIA; will continue Ecotrin 81 mg and Moderate intensity Statin on discharge; No neurological weakness at this time  Will place on IV Fluid as patient also received contrast recently  Cardio Consult Dr. Jaquez  Permissive HTN for 24 hrs completed; Resume Lisinopril today; if BP elevated AM first increase Lisinopril to 5 mg daily; Monitor renal fn closely  Continue Lisinopril 2.5 g daily; will not increase as CARLOS and therefore resume increased dose increase Amlodipine; if renal fn better increase Lisinopril if BP>130/80 mm Hg  F/U Ortho Dr. Jernigan as outpatient  DVT ppx    Discussed with patient findings and plan of care with Italian  as above  Also spoke with Ino Johnson at length and updated all the above and stressed ENT f/u outpatient; also lifestyle modification with Exercise and weight loss stressed  Discussed with PGY1 Dr. Garcia; possible D/C home AM if Renal fn stable; May f/u Neuro outpt if Headache recurs

## 2022-11-20 NOTE — PROGRESS NOTE ADULT - PROBLEM SELECTOR PLAN 4
p/w left sided ear pain, hearing loss, vertiginous sx, with nausea and vomiting   -MRI shows left mastoid and middle ear effusion  -start Augmentin 875mg PO BID

## 2022-11-20 NOTE — PROGRESS NOTE ADULT - SUBJECTIVE AND OBJECTIVE BOX
PATIENT SEEN AND EXAMINED ON :- 11/20/22  DATE OF SERVICE: 11/20/22            Interim events noted,Labs ,Radiological studies and Cardiology tests reviewed .       HOSPITAL COURSE: HPI:  Patient is a 62 y/o F, German-speaking F from [place] w/ PMHx of HTN, OA. Patient is a poor historian. Collateral Hx was attempted from her son, Alex Newberry ([phone]) but he is not sure about patient's pharmacy and PCP. Patient is complaining of She was scheduled for elective R total knee replacement on 11/18/22. She did not take her medications on the day of admission. Pre-Op she was complaining of severe headache at the back of her head, nausea and dizziness. Her admission BP was 169/102 but went up to 215/111. The surgery was cancelled and patient was transferred to PACU for close monitoring. Medicine was consulted and patient was subsequently admitted. She continued to have above symptoms. Her BP was noted to be -160s. She received a dose of IV Zofran and Tylenol. She passed her dysphagia screen. Upon PE, patient was noted to be AAOx3, had some confusion and left lower facial droop w/ associated numbness. She also had weakness on her LUE (4/5). CODE STROKE was called. NIHSS was 2. Neurology (Dr. Thompson) was consulted. CTA H&N showed no acute infarct nor bleed. Patient was subsequently transferred to  for Telemetry monitoring.    GOC: FULL CODE (18 Nov 2022 13:33)      INTERIM EVENTS:Patient seen at bedside ,interim events noted.      PMH -reviewed admission note, no change since admission  HEART FAILURE: Acute[ ]Chronic[ ] Systolic[ ] Diastolic[ ] Combined Systolic and Diastolic[ ]  CAD[ ] CABG[ ] PCI[ ]  DEVICES[ ] PPM[ ] ICD[ ] ILR[ ]  ATRIAL FIBRILLATION[ ] Paroxysmal[ ] Permanent[ ] CHADS2-[  ]  CARLOS[ ] CKD1[ ] CKD2[ ] CKD3[ ] CKD4[ ] ESRD[ ]  COPD[ ] HTN[ ]   DM[ ] Type1[ ] Type 2[ ]   CVA[ ] Paresis[ ]    AMBULATION: Assisted[ ] Cane/walker[ ] Independent[ ]    MEDICATIONS  (STANDING):  amLODIPine   Tablet 5 milliGRAM(s) Oral daily  amoxicillin  875 milliGRAM(s)/clavulanate 1 Tablet(s) Oral two times a day  aspirin  chewable 81 milliGRAM(s) Oral daily  atorvastatin 80 milliGRAM(s) Oral at bedtime  lisinopril 2.5 milliGRAM(s) Oral daily  sodium chloride 0.45%. 1000 milliLiter(s) (75 mL/Hr) IV Continuous <Continuous>  sodium chloride 0.9% lock flush 3 milliLiter(s) IV Push every 8 hours    MEDICATIONS  (PRN):  acetaminophen     Tablet .. 650 milliGRAM(s) Oral every 6 hours PRN Mild Pain (1 - 3)  ondansetron Injectable 4 milliGRAM(s) IV Push every 8 hours PRN Nausea and/or Vomiting            REVIEW OF SYSTEMS:  Constitutional: [ ] fever, [ ]weight loss,  [ ]fatigue [ ]weight gain  Eyes: [ ] visual changes  Respiratory: [ ]shortness of breath;  [ ] cough, [ ]wheezing, [ ]chills, [ ]hemoptysis  Cardiovascular: [ ] chest pain, [ ]palpitations, [ ]dizziness,  [ ]leg swelling[ ]orthopnea[ ]PND  Gastrointestinal: [ ] abdominal pain, [ ]nausea, [ ]vomiting,  [ ]diarrhea [ ]Constipation [ ]Melena  Genitourinary: [ ] dysuria, [ ] hematuria [ ]Russell  Neurologic: [ ] headaches [ ] tremors[ ]weakness [ ]Paralysis Right[ ] Left[ ]  Skin: [ ] itching, [ ]burning, [ ] rashes  Endocrine: [ ] heat or cold intolerance  Musculoskeletal: [ ] joint pain or swelling; [ ] muscle, back, or extremity pain  Psychiatric: [ ] depression, [ ]anxiety, [ ]mood swings, or [ ]difficulty sleeping  Hematologic: [ ] easy bruising, [ ] bleeding gums    [ ] All remaining systems negative except as per above.   [ ]Unable to obtain.  [x] No change in ROS since admission      Vital Signs Last 24 Hrs  T(C): 36.7 (20 Nov 2022 20:24), Max: 37.3 (19 Nov 2022 23:32)  T(F): 98 (20 Nov 2022 20:24), Max: 99.1 (19 Nov 2022 23:32)  HR: 77 (20 Nov 2022 20:24) (68 - 98)  BP: 154/90 (20 Nov 2022 20:24) (133/66 - 173/89)  BP(mean): --  RR: 18 (20 Nov 2022 20:24) (18 - 18)  SpO2: 99% (20 Nov 2022 20:24) (96% - 99%)    Parameters below as of 20 Nov 2022 20:24  Patient On (Oxygen Delivery Method): room air      I&O's Summary      PHYSICAL EXAM:  General: No acute distress BMI-  HEENT: EOMI, PERRL  Neck: Supple, [ ] JVD  Lungs: Equal air entry bilaterally; [ ] rales [ ] wheezing [ ] rhonchi  Heart: Regular rate and rhythm; [x ] murmur   2/6 [ x] systolic [ ] diastolic [ ] radiation[ ] rubs [ ]  gallops  Abdomen: Nontender, bowel sounds present  Extremities: No clubbing, cyanosis, [ ] edema [ ]Pulses  equal and intact  Nervous system:  Alert & Oriented X3, no focal deficits  Psychiatric: Normal affect  Skin: No rashes or lesions    LABS:  11-20    145  |  112<H>  |  25<H>  ----------------------------<  116<H>  4.2   |  30  |  1.40<H>    Ca    9.1      20 Nov 2022 06:56  Phos  3.6     11-20  Mg     2.1     11-20    TPro  6.6  /  Alb  2.8<L>  /  TBili  0.3  /  DBili  x   /  AST  11  /  ALT  22  /  AlkPhos  69  11-20    Creatinine Trend: 1.40<--, 1.14<--, 1.13<--, 1.46<--                        11.9   6.38  )-----------( 253      ( 20 Nov 2022 06:56 )             37.2

## 2022-11-20 NOTE — PROGRESS NOTE ADULT - PROBLEM SELECTOR PLAN 3
Pt w/ SCr 1.46 on admission  unknown baseline SCr  sCr 1.46>1.13>1.14>1.40  start IVF 1/2NS 75cc/hr for 24hrs  monitor BMP

## 2022-11-20 NOTE — PROGRESS NOTE ADULT - PROBLEM SELECTOR PLAN 1
p/w elevated BP, headache, dizziness, nausea  code stroke in PACU with NIHSS 2  ASA qd, high intensity statin qhs, plavix x21 days if true stroke  CTA H/N was neg  MR Head (11/19) No acute infarcts, hemorrhage or mass. Left mastoid and middle ear effusion  Tele monitoring  F/U Echo with bubble study  Pt not rTPA candidate due to: unknown time of onset  Monitor BP - d/c permissive htn x24hr from last known normal  start amlodipine 5mg PO qd  c/w lisinopril 2.5mg qd  PT consult  Dysphagia screen: passed  Due to no MR Head findings, less likely CVA will remove neuro checks and plavix  Continue ASA in light of possible TIA  Neuro Consulted: Dr. Thompson  Cardio Consulted: Dr. Jaquez

## 2022-11-21 VITALS
OXYGEN SATURATION: 96 % | RESPIRATION RATE: 18 BRPM | SYSTOLIC BLOOD PRESSURE: 130 MMHG | HEART RATE: 76 BPM | TEMPERATURE: 98 F | DIASTOLIC BLOOD PRESSURE: 80 MMHG

## 2022-11-21 LAB
ALBUMIN SERPL ELPH-MCNC: 3.1 G/DL — LOW (ref 3.5–5)
ALP SERPL-CCNC: 67 U/L — SIGNIFICANT CHANGE UP (ref 40–120)
ALT FLD-CCNC: 19 U/L DA — SIGNIFICANT CHANGE UP (ref 10–60)
ANION GAP SERPL CALC-SCNC: 4 MMOL/L — LOW (ref 5–17)
AST SERPL-CCNC: 12 U/L — SIGNIFICANT CHANGE UP (ref 10–40)
BASOPHILS # BLD AUTO: 0.03 K/UL — SIGNIFICANT CHANGE UP (ref 0–0.2)
BASOPHILS NFR BLD AUTO: 0.5 % — SIGNIFICANT CHANGE UP (ref 0–2)
BILIRUB SERPL-MCNC: 0.4 MG/DL — SIGNIFICANT CHANGE UP (ref 0.2–1.2)
BUN SERPL-MCNC: 19 MG/DL — HIGH (ref 7–18)
CALCIUM SERPL-MCNC: 8.7 MG/DL — SIGNIFICANT CHANGE UP (ref 8.4–10.5)
CHLORIDE SERPL-SCNC: 112 MMOL/L — HIGH (ref 96–108)
CO2 SERPL-SCNC: 29 MMOL/L — SIGNIFICANT CHANGE UP (ref 22–31)
CREAT SERPL-MCNC: 1.19 MG/DL — SIGNIFICANT CHANGE UP (ref 0.5–1.3)
EGFR: 52 ML/MIN/1.73M2 — LOW
EOSINOPHIL # BLD AUTO: 0.17 K/UL — SIGNIFICANT CHANGE UP (ref 0–0.5)
EOSINOPHIL NFR BLD AUTO: 2.7 % — SIGNIFICANT CHANGE UP (ref 0–6)
GLUCOSE SERPL-MCNC: 100 MG/DL — HIGH (ref 70–99)
HCT VFR BLD CALC: 40 % — SIGNIFICANT CHANGE UP (ref 34.5–45)
HGB BLD-MCNC: 12.7 G/DL — SIGNIFICANT CHANGE UP (ref 11.5–15.5)
IMM GRANULOCYTES NFR BLD AUTO: 0.3 % — SIGNIFICANT CHANGE UP (ref 0–0.9)
LYMPHOCYTES # BLD AUTO: 2.1 K/UL — SIGNIFICANT CHANGE UP (ref 1–3.3)
LYMPHOCYTES # BLD AUTO: 33.9 % — SIGNIFICANT CHANGE UP (ref 13–44)
MAGNESIUM SERPL-MCNC: 2 MG/DL — SIGNIFICANT CHANGE UP (ref 1.6–2.6)
MCHC RBC-ENTMCNC: 28 PG — SIGNIFICANT CHANGE UP (ref 27–34)
MCHC RBC-ENTMCNC: 31.8 GM/DL — LOW (ref 32–36)
MCV RBC AUTO: 88.1 FL — SIGNIFICANT CHANGE UP (ref 80–100)
MONOCYTES # BLD AUTO: 0.47 K/UL — SIGNIFICANT CHANGE UP (ref 0–0.9)
MONOCYTES NFR BLD AUTO: 7.6 % — SIGNIFICANT CHANGE UP (ref 2–14)
NEUTROPHILS # BLD AUTO: 3.4 K/UL — SIGNIFICANT CHANGE UP (ref 1.8–7.4)
NEUTROPHILS NFR BLD AUTO: 55 % — SIGNIFICANT CHANGE UP (ref 43–77)
NRBC # BLD: 0 /100 WBCS — SIGNIFICANT CHANGE UP (ref 0–0)
PHOSPHATE SERPL-MCNC: 2.8 MG/DL — SIGNIFICANT CHANGE UP (ref 2.5–4.5)
PLATELET # BLD AUTO: 247 K/UL — SIGNIFICANT CHANGE UP (ref 150–400)
POTASSIUM SERPL-MCNC: 4.1 MMOL/L — SIGNIFICANT CHANGE UP (ref 3.5–5.3)
POTASSIUM SERPL-SCNC: 4.1 MMOL/L — SIGNIFICANT CHANGE UP (ref 3.5–5.3)
PROT SERPL-MCNC: 6.7 G/DL — SIGNIFICANT CHANGE UP (ref 6–8.3)
RBC # BLD: 4.54 M/UL — SIGNIFICANT CHANGE UP (ref 3.8–5.2)
RBC # FLD: 12 % — SIGNIFICANT CHANGE UP (ref 10.3–14.5)
SODIUM SERPL-SCNC: 145 MMOL/L — SIGNIFICANT CHANGE UP (ref 135–145)
WBC # BLD: 6.19 K/UL — SIGNIFICANT CHANGE UP (ref 3.8–10.5)
WBC # FLD AUTO: 6.19 K/UL — SIGNIFICANT CHANGE UP (ref 3.8–10.5)

## 2022-11-21 PROCEDURE — 83935 ASSAY OF URINE OSMOLALITY: CPT

## 2022-11-21 PROCEDURE — 70498 CT ANGIOGRAPHY NECK: CPT

## 2022-11-21 PROCEDURE — 80061 LIPID PANEL: CPT

## 2022-11-21 PROCEDURE — 86901 BLOOD TYPING SEROLOGIC RH(D): CPT

## 2022-11-21 PROCEDURE — 83036 HEMOGLOBIN GLYCOSYLATED A1C: CPT

## 2022-11-21 PROCEDURE — 97162 PT EVAL MOD COMPLEX 30 MIN: CPT

## 2022-11-21 PROCEDURE — 71045 X-RAY EXAM CHEST 1 VIEW: CPT

## 2022-11-21 PROCEDURE — 36415 COLL VENOUS BLD VENIPUNCTURE: CPT

## 2022-11-21 PROCEDURE — 93306 TTE W/DOPPLER COMPLETE: CPT

## 2022-11-21 PROCEDURE — 99239 HOSP IP/OBS DSCHRG MGMT >30: CPT | Mod: GC

## 2022-11-21 PROCEDURE — 82570 ASSAY OF URINE CREATININE: CPT

## 2022-11-21 PROCEDURE — 86803 HEPATITIS C AB TEST: CPT

## 2022-11-21 PROCEDURE — 92523 SPEECH SOUND LANG COMPREHEN: CPT

## 2022-11-21 PROCEDURE — 84156 ASSAY OF PROTEIN URINE: CPT

## 2022-11-21 PROCEDURE — 93005 ELECTROCARDIOGRAM TRACING: CPT

## 2022-11-21 PROCEDURE — 84100 ASSAY OF PHOSPHORUS: CPT

## 2022-11-21 PROCEDURE — 84300 ASSAY OF URINE SODIUM: CPT

## 2022-11-21 PROCEDURE — 93306 TTE W/DOPPLER COMPLETE: CPT | Mod: 26

## 2022-11-21 PROCEDURE — 82553 CREATINE MB FRACTION: CPT

## 2022-11-21 PROCEDURE — 70496 CT ANGIOGRAPHY HEAD: CPT

## 2022-11-21 PROCEDURE — 84484 ASSAY OF TROPONIN QUANT: CPT

## 2022-11-21 PROCEDURE — 86140 C-REACTIVE PROTEIN: CPT

## 2022-11-21 PROCEDURE — 85610 PROTHROMBIN TIME: CPT

## 2022-11-21 PROCEDURE — 82962 GLUCOSE BLOOD TEST: CPT

## 2022-11-21 PROCEDURE — 82550 ASSAY OF CK (CPK): CPT

## 2022-11-21 PROCEDURE — 80053 COMPREHEN METABOLIC PANEL: CPT

## 2022-11-21 PROCEDURE — 85730 THROMBOPLASTIN TIME PARTIAL: CPT

## 2022-11-21 PROCEDURE — 70551 MRI BRAIN STEM W/O DYE: CPT

## 2022-11-21 PROCEDURE — 81001 URINALYSIS AUTO W/SCOPE: CPT

## 2022-11-21 PROCEDURE — 80048 BASIC METABOLIC PNL TOTAL CA: CPT

## 2022-11-21 PROCEDURE — 83735 ASSAY OF MAGNESIUM: CPT

## 2022-11-21 PROCEDURE — 0042T: CPT

## 2022-11-21 PROCEDURE — 85652 RBC SED RATE AUTOMATED: CPT

## 2022-11-21 PROCEDURE — 86850 RBC ANTIBODY SCREEN: CPT

## 2022-11-21 PROCEDURE — 85025 COMPLETE CBC W/AUTO DIFF WBC: CPT

## 2022-11-21 PROCEDURE — 70450 CT HEAD/BRAIN W/O DYE: CPT

## 2022-11-21 PROCEDURE — 86900 BLOOD TYPING SEROLOGIC ABO: CPT

## 2022-11-21 RX ORDER — ASPIRIN/CALCIUM CARB/MAGNESIUM 324 MG
1 TABLET ORAL
Qty: 30 | Refills: 0
Start: 2022-11-21 | End: 2022-12-20

## 2022-11-21 RX ORDER — ACETAMINOPHEN 500 MG
2 TABLET ORAL
Qty: 0 | Refills: 0 | DISCHARGE

## 2022-11-21 RX ORDER — ATORVASTATIN CALCIUM 80 MG/1
1 TABLET, FILM COATED ORAL
Qty: 30 | Refills: 0
Start: 2022-11-21 | End: 2022-12-20

## 2022-11-21 RX ORDER — OFLOXACIN 200 MG
5 TABLET ORAL
Qty: 0 | Refills: 0 | DISCHARGE

## 2022-11-21 RX ORDER — ACETAMINOPHEN 500 MG
650 TABLET ORAL EVERY 6 HOURS
Refills: 0 | Status: DISCONTINUED | OUTPATIENT
Start: 2022-11-21 | End: 2022-11-21

## 2022-11-21 RX ORDER — CHOLECALCIFEROL (VITAMIN D3) 125 MCG
1 CAPSULE ORAL
Qty: 0 | Refills: 0 | DISCHARGE

## 2022-11-21 RX ORDER — AMLODIPINE BESYLATE 2.5 MG/1
1 TABLET ORAL
Qty: 0 | Refills: 0 | DISCHARGE

## 2022-11-21 RX ORDER — AMLODIPINE BESYLATE 2.5 MG/1
1 TABLET ORAL
Qty: 30 | Refills: 0
Start: 2022-11-21 | End: 2022-12-20

## 2022-11-21 RX ADMIN — Medication 650 MILLIGRAM(S): at 13:18

## 2022-11-21 RX ADMIN — Medication 81 MILLIGRAM(S): at 13:18

## 2022-11-21 RX ADMIN — Medication 650 MILLIGRAM(S): at 14:00

## 2022-11-21 RX ADMIN — AMLODIPINE BESYLATE 5 MILLIGRAM(S): 2.5 TABLET ORAL at 05:19

## 2022-11-21 RX ADMIN — Medication 650 MILLIGRAM(S): at 05:23

## 2022-11-21 RX ADMIN — SODIUM CHLORIDE 3 MILLILITER(S): 9 INJECTION INTRAMUSCULAR; INTRAVENOUS; SUBCUTANEOUS at 15:17

## 2022-11-21 RX ADMIN — Medication 1 TABLET(S): at 05:19

## 2022-11-21 RX ADMIN — LISINOPRIL 2.5 MILLIGRAM(S): 2.5 TABLET ORAL at 05:19

## 2022-11-21 NOTE — SPEECH LANGUAGE PATHOLOGY EVALUATION - SLP PERTINENT HISTORY OF CURRENT PROBLEM
Patient is a 60 y/o F, Upper sorbian-speaking F from [place] w/ PMHx of HTN, OA. Admitted for HTN emergency (r/o CVA), possible mastoiditis on MRI. As per radiology, MR Head (11/19/22) remarkable for few nonspecific white matter changes; no acute infarcts, hemorrhage, or mass; L mastoid & middle ear effusion.

## 2022-11-21 NOTE — PROGRESS NOTE ADULT - ASSESSMENT
Patient is a 62 y/o F, Urdu-speaking F from [place] w/ PMHx of HTN, OA. Admitted for HTN emergency (r/o CVA)
Patient is a 62 y/o F, Malay-speaking F from [place] w/ PMHx of HTN, OA. Admitted for HTN emergency (r/o CVA), possible mastoiditis on MRI
Patient is a 62 y/o F, Sinhala-speaking F from [place] w/ PMHx of HTN, OA. Admitted for HTN emergency (r/o CVA), possible mastoiditis on MRI
Patient is a 62 y/o F, Croatian-speaking F from [place] w/ PMHx of HTN, OA. Admitted for HTN emergency (r/o CVA), possible mastoiditis on MRI
Patient is a 62 y/o F, Sami-speaking F from [place] w/ PMHx of HTN, OA. Admitted for HTN emergency (r/o CVA)
Patient is a 62 y/o F, Greek-speaking F from [place] w/ PMHx of HTN, OA. Admitted for HTN emergency (r/o CVA), possible mastoiditis on MRI

## 2022-11-21 NOTE — PATIENT PROFILE ADULT - HAS THE PATIENT RECEIVED THE INFLUENZA VACCINE THIS SEASON?
Skyrizi Pregnancy And Lactation Text: The risk during pregnancy and breastfeeding is uncertain with this medication. unable to assess immunization status...

## 2022-11-21 NOTE — SPEECH LANGUAGE PATHOLOGY EVALUATION - SLP DIAGNOSIS
Pt p/w expressive/receptive language skills WFL c/b intact conversational discourse, semantically appropriate responses to open-ended questions, intact confrontation naming, and ability to follow 1-step & multistep directions. Pt p/w L ear hearing loss; benefited from increased vocal volume & visual cues. As per , pt p/w intelligible speech marked by articulation & rate of speech WFL ("speaks just like anyone does; no slurring"); however,  reported pt appeared hyponasal.

## 2022-11-21 NOTE — PROGRESS NOTE ADULT - PROBLEM SELECTOR PLAN 1
ASA qd, high intensity statin qhs, plavix x21 days if true stroke  CTA H/N was neg  MR Head (11/19) No acute infarcts, hemorrhage or mass. Left mastoid and middle ear effusion  Tele monitoring  F/U Echo with bubble study  start amlodipine 5mg PO qd  c/w lisinopril 2.5mg qd  Due to no MR Head findings, less likely CVA will remove neuro checks and plavix  Continue ASA in light of possible TIA

## 2022-11-21 NOTE — PROGRESS NOTE ADULT - SUBJECTIVE AND OBJECTIVE BOX
PGY-1 Progress Note discussed with attending    PAGER #: [[phone]] TILL 5:00 PM  PLEASE CONTACT ON CALL TEAM:  - On Call Team (Please refer to Lexi) FROM 5:00 PM - 8:30PM  - Nightfloat Team FROM 8:30 -7:30 AM    CHIEF COMPLAINT & BRIEF HOSPITAL COURSE:    INTERVAL HPI/OVERNIGHT EVENTS:   MEDICATIONS  (STANDING):  amLODIPine   Tablet 5 milliGRAM(s) Oral daily  amoxicillin  875 milliGRAM(s)/clavulanate 1 Tablet(s) Oral two times a day  aspirin  chewable 81 milliGRAM(s) Oral daily  atorvastatin 80 milliGRAM(s) Oral at bedtime  lisinopril 2.5 milliGRAM(s) Oral daily  sodium chloride 0.45%. 1000 milliLiter(s) (75 mL/Hr) IV Continuous <Continuous>  sodium chloride 0.9% lock flush 3 milliLiter(s) IV Push every 8 hours    MEDICATIONS  (PRN):  acetaminophen     Tablet .. 650 milliGRAM(s) Oral every 6 hours PRN Mild Pain (1 - 3)  ondansetron Injectable 4 milliGRAM(s) IV Push every 8 hours PRN Nausea and/or Vomiting      REVIEW OF SYSTEMS:  CONSTITUTIONAL: No fever, weight loss, or fatigue  RESPIRATORY: No cough, wheezing, chills or hemoptysis; No shortness of breath  CARDIOVASCULAR: No chest pain, palpitations, dizziness, or leg swelling  GASTROINTESTINAL: No abdominal pain. No nausea, vomiting, or hematemesis; No diarrhea or constipation. No melena or hematochezia.  GENITOURINARY: No dysuria or hematuria, urinary frequency  NEUROLOGICAL: No headaches, memory loss, loss of strength, numbness, or tremors  SKIN: No itching, burning, rashes, or lesions     Vital Signs Last 24 Hrs  T(C): 36.5 (21 Nov 2022 04:48), Max: 36.9 (20 Nov 2022 08:14)  T(F): 97.7 (21 Nov 2022 04:48), Max: 98.5 (20 Nov 2022 08:14)  HR: 87 (21 Nov 2022 04:48) (68 - 90)  BP: 150/88 (21 Nov 2022 04:48) (133/66 - 173/89)  BP(mean): --  RR: 18 (21 Nov 2022 04:48) (18 - 18)  SpO2: 87% (21 Nov 2022 04:48) (87% - 99%)    Parameters below as of 21 Nov 2022 04:48  Patient On (Oxygen Delivery Method): room air        PHYSICAL EXAMINATION:  GENERAL: NAD, well built  HEAD:  Atraumatic, Normocephalic  EYES:  conjunctiva and sclera clear  NECK: Supple, No JVD, Normal thyroid  CHEST/LUNG: Clear to auscultation. Clear to percussion bilaterally; No rales, rhonchi, wheezing, or rubs  HEART: Regular rate and rhythm; No murmurs, rubs, or gallops  ABDOMEN: Soft, Nontender, Nondistended; Bowel sounds present  NERVOUS SYSTEM:  Alert & Oriented X3,    EXTREMITIES:  2+ Peripheral Pulses, No clubbing, cyanosis, or edema  SKIN: warm dry                          12.7   6.19  )-----------( 247      ( 21 Nov 2022 05:31 )             40.0     11-21    145  |  112<H>  |  19<H>  ----------------------------<  100<H>  4.1   |  29  |  1.19    Ca    8.7      21 Nov 2022 05:31  Phos  2.8     11-21  Mg     2.0     11-21    TPro  6.7  /  Alb  3.1<L>  /  TBili  0.4  /  DBili  x   /  AST  12  /  ALT  19  /  AlkPhos  67  11-21    LIVER FUNCTIONS - ( 21 Nov 2022 05:31 )  Alb: 3.1 g/dL / Pro: 6.7 g/dL / ALK PHOS: 67 U/L / ALT: 19 U/L DA / AST: 12 U/L / GGT: x                   CAPILLARY BLOOD GLUCOSE      RADIOLOGY & ADDITIONAL TESTS:                   PGY-1 Progress Note discussed with attending    PAGER #: [[phone]] TILL 5:00 PM  PLEASE CONTACT ON CALL TEAM:  - On Call Team (Please refer to Lexi) FROM 5:00 PM - 8:30PM  - Nightfloat Team FROM 8:30 -7:30 AM    INTERVAL HPI/OVERNIGHT EVENTS:   Patient seen and examined at bedside. No acute events overnight. Pt states she has left ear discomfort.     MEDICATIONS  (STANDING):  amLODIPine   Tablet 5 milliGRAM(s) Oral daily  amoxicillin  875 milliGRAM(s)/clavulanate 1 Tablet(s) Oral two times a day  aspirin  chewable 81 milliGRAM(s) Oral daily  atorvastatin 80 milliGRAM(s) Oral at bedtime  lisinopril 2.5 milliGRAM(s) Oral daily  sodium chloride 0.45%. 1000 milliLiter(s) (75 mL/Hr) IV Continuous <Continuous>  sodium chloride 0.9% lock flush 3 milliLiter(s) IV Push every 8 hours    MEDICATIONS  (PRN):  acetaminophen     Tablet .. 650 milliGRAM(s) Oral every 6 hours PRN Mild Pain (1 - 3)  ondansetron Injectable 4 milliGRAM(s) IV Push every 8 hours PRN Nausea and/or Vomiting      REVIEW OF SYSTEMS:  CONSTITUTIONAL: No fever, weight loss, or fatigue  RESPIRATORY: No cough, wheezing, chills or hemoptysis; No shortness of breath  CARDIOVASCULAR: No chest pain, palpitations, dizziness, or leg swelling  GASTROINTESTINAL: No abdominal pain. No nausea, vomiting, or hematemesis; No diarrhea or constipation. No melena or hematochezia.  GENITOURINARY: No dysuria or hematuria, urinary frequency  NEUROLOGICAL: No headaches, memory loss, loss of strength, numbness, or tremors  SKIN: No itching, burning, rashes, or lesions     Vital Signs Last 24 Hrs  T(C): 36.5 (21 Nov 2022 04:48), Max: 36.9 (20 Nov 2022 08:14)  T(F): 97.7 (21 Nov 2022 04:48), Max: 98.5 (20 Nov 2022 08:14)  HR: 87 (21 Nov 2022 04:48) (68 - 90)  BP: 150/88 (21 Nov 2022 04:48) (133/66 - 173/89)  BP(mean): --  RR: 18 (21 Nov 2022 04:48) (18 - 18)  SpO2: 87% (21 Nov 2022 04:48) (87% - 99%)    Parameters below as of 21 Nov 2022 04:48  Patient On (Oxygen Delivery Method): room air        PHYSICAL EXAMINATION:  General: WDWN  HEENT: NC/AT; PERRL, anicteric sclera; MMM, +pain to percussion of L mastoid  Neck: supple  Cardiovascular: +S1/S2; RRR  Respiratory: CTA B/L; no W/R/R  Gastrointestinal: soft, NT/ND; +BSx4  Extremities: WWP; no edema, clubbing or cyanosis  Vascular: 2+ radial, DP/PT pulses B/L  Skin: Warm, dry, good turgor, no rashes  Neurological: AAOx3; no focal deficits, equal strength throughout                             12.7   6.19  )-----------( 247      ( 21 Nov 2022 05:31 )             40.0     11-21    145  |  112<H>  |  19<H>  ----------------------------<  100<H>  4.1   |  29  |  1.19    Ca    8.7      21 Nov 2022 05:31  Phos  2.8     11-21  Mg     2.0     11-21    TPro  6.7  /  Alb  3.1<L>  /  TBili  0.4  /  DBili  x   /  AST  12  /  ALT  19  /  AlkPhos  67  11-21    LIVER FUNCTIONS - ( 21 Nov 2022 05:31 )  Alb: 3.1 g/dL / Pro: 6.7 g/dL / ALK PHOS: 67 U/L / ALT: 19 U/L DA / AST: 12 U/L / GGT: x                   CAPILLARY BLOOD GLUCOSE      RADIOLOGY & ADDITIONAL TESTS:

## 2022-11-21 NOTE — SPEECH LANGUAGE PATHOLOGY EVALUATION - COMMENTS
Refer for audiological evaluation w/ consideration to L ear hearing loss & tinnitus. Pt seen for speech & language evaluation. Exam interpreted in Latvian via ; VRI #[id number]. A+Ox3; pt sitting up in bed. Clinical findings were limited 2/2 limited materials for Latvian speakers & dependence on  for assessment of intelligibility. Pt denied any difficulty communicating; however, reported L ear hearing loss, pain, & tinnitus ("noise in ear"). Pt was administered modified Mini-Mental State Exam. As per , pt p/w intelligible speech marked by articulation & rate of speech WFL ("speaks just like anyone does; no slurring"); however,  reported pt appeared hyponasal.

## 2022-11-21 NOTE — DISCHARGE NOTE NURSING/CASE MANAGEMENT/SOCIAL WORK - NSDCPEFALRISK_GEN_ALL_CORE
For information on Fall & Injury Prevention, visit: [email or web address] OR  [email or web address] OR  https://www.cdc.gov/steadi/patient.html

## 2022-11-21 NOTE — PROGRESS NOTE ADULT - PROBLEM SELECTOR PLAN 2
h/o HTN on lisinopril, amlodipine  Monitor BP  hold BP meds for 24 hrs permissive HTN  Cardio Consulted: Dr. Jaquez
h/o HTN on lisinopril, amlodipine  Monitor BP  hold BP meds for 24 hrs permissive HTN  Cardio Consulted: Dr. Jaquez
h/o HTN on lisinopril, amlodipine  Monitor BP  held BP meds for 24 hrs permissive HTN  start amlodipine 5mg PO qd  c/w lisinopril 2.5mg qd
h/o HTN on lisinopril, amlodipine  Monitor BP  held BP meds for 24 hrs permissive HTN  start amlodipine 5mg PO qd  c/w lisinopril 2.5mg qd  Cardio Consulted: Dr. Jaquez
amlodipine 5mg and lisinopril
h/o HTN on lisinopril, amlodipine  Monitor BP  held BP meds for 24 hrs permissive HTN  start amlodipine 5mg PO qd  c/w lisinopril 2.5mg qd  Cardio Consulted: Dr. Jaquez

## 2022-11-21 NOTE — DISCHARGE NOTE NURSING/CASE MANAGEMENT/SOCIAL WORK - PATIENT PORTAL LINK FT
You can access the FollowMyHealth Patient Portal offered by [facility] by registering at the following website: [email or web address]. By joining [facility]’s FollowMyHealth portal, you will also be able to view your health information using other applications (apps) compatible with our system.

## 2022-11-21 NOTE — PROGRESS NOTE ADULT - TIME BILLING
- Review of records, telemetry, vital signs and daily labs.   - General and cardiovascular physical examination.  - Generation of cardiovascular treatment plan.  - Coordination of care.      Patient was seen and examined by me on 11/20/22interim events noted,labs and radiology studies reviewed.  Donovan Jaquez MD,FACC.  [address] [place], [place] [id number].  [phone]
- Review of records, telemetry, vital signs and daily labs.   - General and cardiovascular physical examination.  - Generation of cardiovascular treatment plan.  - Coordination of care.      Patient was seen and examined by me on 11/21/22,interim events noted,labs and radiology studies reviewed.  Donovan Jaquez MD,FACC.  [address] [place], [place] [id number].  [phone]
- Review of records, telemetry, vital signs and daily labs.   - General and cardiovascular physical examination.  - Generation of cardiovascular treatment plan.  - Coordination of care.      Patient was seen and examined by me on 11/19/22,interim events noted,labs and radiology studies reviewed.  Donovan Jaquez MD,FACC.  [address] [place], [place] [id number].  [phone]

## 2022-11-21 NOTE — PATIENT PROFILE ADULT - FALL HARM RISK - RISK INTERVENTIONS
Assistance OOB with selected safe patient handling equipment/Assistance with ambulation/Communicate Fall Risk and Risk Factors to all staff, patient, and family/Discuss with provider need for PT consult/Monitor gait and stability/Reinforce activity limits and safety measures with patient and family/Visual Cue: Yellow wristband/Bed in lowest position, wheels locked, appropriate side rails in place/Call bell, personal items and telephone in reach/Instruct patient to call for assistance before getting out of bed or chair/Non-slip footwear when patient is out of bed/[place] to call system/Physically safe environment - no spills, clutter or unnecessary equipment/Purposeful Proactive Rounding/Room/bathroom lighting operational, light cord in reach

## 2022-11-21 NOTE — PROGRESS NOTE ADULT - REASON FOR ADMISSION
HTN emergency (r/o CVA)

## 2022-11-21 NOTE — PROGRESS NOTE ADULT - ATTENDING COMMENTS
Patient seen and examined this morning around 10.30 AM; [place]  used: German ([employer]) ID# [id number]    60 y/o Female, German-speaking Female from [place] w/ PMH of HTN, OA.. Patient was scheduled for elective Right total knee replacement on 11/18/22. She did not take her medications on the day of admission as she she was NPO after midnight. Pre-Op she was complaining of severe headache at the back of her head, nausea and dizziness. Her admission BP was 169/102 but went up to 215/111. Patient got Versed in OR. The surgery was cancelled and patient was transferred to PACU for close monitoring. Medicine was consulted and patient was subsequently admitted.  On PGY2 assessment patient was noted ot have a facial droop (unclear if old or new). Code stroke called; Patient was AAOx 3, able to answer all questions appropriately. minor weakness noted on Left arm. No other neurological deficits noted.   CT Angio and CT Brain negative for CVA; patient seen by Neuro advised MR Brain.     patient doing well clinically; Headache resolved; no neuro complaints. no new complaints except left ear hearing loss; MRI showed Mastoiditis   denies fever, chills, SOB, palpitations, chest pain, n, diarrhea, constipation, dizziness    Vital Signs Last 24 Hrs  T(C): 36.7 (21 Nov 2022 15:06), Max: 36.7 (21 Nov 2022 15:06)  T(F): 98 (21 Nov 2022 15:06), Max: 98 (21 Nov 2022 15:06)  HR: 76 (21 Nov 2022 15:06) (76 - 76)  BP: 130/80 (21 Nov 2022 15:06) (130/80 - 130/80)  RR: 18 (21 Nov 2022 15:06) (18 - 18)  SpO2: 96% (21 Nov 2022 15:06) (96% - 96%) room air    P/E:  Psych: AAO x3; stable mood  Neuro: No gross focal deficits; Power and sensation intact; no obvious motor or sensory deficit appreciated  CVS: S1S2 present, regular, no edema  Resp: BLAE+, No wheeze or Rhonchi  GI: Soft, BS+, Non tender, non distended  Extr: No  calf tenderness B/L Lower extremities  Skin: Warm and moist without any rashes    Labs:             12.7   6.19  )-----------( 247      ( 21 Nov 2022 05:31 )             40.0   11-21    145  |  112<H>  |  19<H>  ----------------------------<  100<H>  4.1   |  29  |  1.19    Ca    8.7      21 Nov 2022 05:31  Phos  2.8     11-21  Mg     2.0     11-21  TPro  6.7  /  Alb  3.1<L>  /  TBili  0.4  /  DBili  x   /  AST  12  /  ALT  19  /  AlkPhos  67  11-21          Transthoracic Echocardiogram (11.21.22 @ 15:02)     CONCLUSIONS:  1. Normal mitral valve. Trace mitral regurgitation.  2. Aortic valve not well visualized. No aortic stenosis by Doppler gradients.  3. Upper limit normal wall thickness, endocardium not well visualized.  4. Endocardium not well visualized; normal left ventricular systolic function. Segmental wall motion could not be assessed.  5. Unable to adequately assess diastolic function due to technical aspects of this study.  6. Normal right ventricular size and systolic function (TAPSE 1.7 cm).  7. Incomplete tricuspid regurgitation jet precludes accurate assessment of pulmonary artery systolic pressure.  8. Tricuspid valve not well seen. Mild tricuspid regurgitation.  9. Pulmonic valve not well seen.  10. Study quality is suboptimal for evaluation of intracardiac shunt, no obvious intracardiac shunt is noted during Agitated saline injection.    MR Head No Cont (11.19.22 @ 15:14)   IMPRESSION: A few nonspecific white matter changes. No acute infarcts, hemorrhage or mass. Left mastoid and middle ear effusion.    D/D:  HTN urgency improved  suspected Left Mastoiditis  Facial droop and left arm weakness concerning for CVA, ruled out possible TIA  CARLOS possibly HTN kidney disease plus possibly prerenal from recent vomiting resolved with hydration  Hx OA Right knee    Plan:  Given findings of possible Mastoiditis and patient symptom of ear ringing sensation complete Augmentin 875 mg BID x10 d will refer ENT as outpatient; Son advised to call Dr. Jaquez office can give a referral; also rpovided name for Dr[facility] Gaviria at [facility]  CT Brain stroke protocol and CT Angio and perfusion studies negative; MR negative; No acute CVA;   d/w Dr. Agopian; possible TIA; will continue Ecotrin 81 mg and Moderate intensity Statin on discharge; No neurological weakness at this time  May f/u outpt Cardio Dr. Leonid Winter 5mg daily and Lisinopril 2.5 mg home dose on discharge; ; if renal fn stable as outpt,  increase Lisinopril if BP>130/80 mm Hg  F/U Ortho Dr. Jernigan as outpatient for reschedule Rt TKR    Discussed with patient findings and plan of care with German  as above  Also spoke with Ino Johnson at length over phone and bedside updated all the above and stressed ENT f/u outpatient; also lifestyle modification with Exercise and weight loss stressed  Discussed with PGY1 Dr. Zuniga;  D/C home;   See D/C instructions reviewed and updated

## 2022-11-21 NOTE — PROGRESS NOTE ADULT - SUBJECTIVE AND OBJECTIVE BOX
PATIENT SEEN AND EXAMINED ON :- 11/21/22  DATE OF SERVICE:   11/21/22          Interim events noted,Labs ,Radiological studies and Cardiology tests reviewed .       HOSPITAL COURSE: HPI:  Patient is a 62 y/o F, Latvian-speaking F from [place] w/ PMHx of HTN, OA. Patient is a poor historian. Collateral Hx was attempted from her son, Alex Newberry ([phone]) but he is not sure about patient's pharmacy and PCP. Patient is complaining of She was scheduled for elective R total knee replacement on 11/18/22. She did not take her medications on the day of admission. Pre-Op she was complaining of severe headache at the back of her head, nausea and dizziness. Her admission BP was 169/102 but went up to 215/111. The surgery was cancelled and patient was transferred to PACU for close monitoring. Medicine was consulted and patient was subsequently admitted. She continued to have above symptoms. Her BP was noted to be -160s. She received a dose of IV Zofran and Tylenol. She passed her dysphagia screen. Upon PE, patient was noted to be AAOx3, had some confusion and left lower facial droop w/ associated numbness. She also had weakness on her LUE (4/5). CODE STROKE was called. NIHSS was 2. Neurology (Dr. Thompson) was consulted. CTA H&N showed no acute infarct nor bleed. Patient was subsequently transferred to  for Telemetry monitoring.    GOC: FULL CODE (18 Nov 2022 13:33)      INTERIM EVENTS:Patient seen at bedside ,interim events noted.      PMH -reviewed admission note, no change since admission  HEART FAILURE: Acute[ ]Chronic[ ] Systolic[ ] Diastolic[ ] Combined Systolic and Diastolic[ ]  CAD[ ] CABG[ ] PCI[ ]  DEVICES[ ] PPM[ ] ICD[ ] ILR[ ]  ATRIAL FIBRILLATION[ ] Paroxysmal[ ] Permanent[ ] CHADS2-[  ]  CARLOS[ ] CKD1[ ] CKD2[ ] CKD3[ ] CKD4[ ] ESRD[ ]  COPD[ ] HTN[ ]   DM[ ] Type1[ ] Type 2[ ]   CVA[ ] Paresis[ ]    AMBULATION: Assisted[ ] Cane/walker[ ] Independent[ ]    MEDICATIONS  (STANDING):  acetaminophen     Tablet .. 650 milliGRAM(s) Oral every 6 hours  amLODIPine   Tablet 5 milliGRAM(s) Oral daily  amoxicillin  875 milliGRAM(s)/clavulanate 1 Tablet(s) Oral two times a day  aspirin  chewable 81 milliGRAM(s) Oral daily  atorvastatin 80 milliGRAM(s) Oral at bedtime  lisinopril 2.5 milliGRAM(s) Oral daily  sodium chloride 0.45%. 1000 milliLiter(s) (75 mL/Hr) IV Continuous <Continuous>  sodium chloride 0.9% lock flush 3 milliLiter(s) IV Push every 8 hours    MEDICATIONS  (PRN):  ondansetron Injectable 4 milliGRAM(s) IV Push every 8 hours PRN Nausea and/or Vomiting            REVIEW OF SYSTEMS:  Constitutional: [ ] fever, [ ]weight loss,  [ ]fatigue [ ]weight gain  Eyes: [ ] visual changes  Respiratory: [ ]shortness of breath;  [ ] cough, [ ]wheezing, [ ]chills, [ ]hemoptysis  Cardiovascular: [ ] chest pain, [ ]palpitations, [ ]dizziness,  [ ]leg swelling[ ]orthopnea[ ]PND  Gastrointestinal: [ ] abdominal pain, [ ]nausea, [ ]vomiting,  [ ]diarrhea [ ]Constipation [ ]Melena  Genitourinary: [ ] dysuria, [ ] hematuria [ ]Russell  Neurologic: [ ] headaches [ ] tremors[ ]weakness [ ]Paralysis Right[ ] Left[ ]  Skin: [ ] itching, [ ]burning, [ ] rashes  Endocrine: [ ] heat or cold intolerance  Musculoskeletal: [ ] joint pain or swelling; [ ] muscle, back, or extremity pain  Psychiatric: [ ] depression, [ ]anxiety, [ ]mood swings, or [ ]difficulty sleeping  Hematologic: [ ] easy bruising, [ ] bleeding gums    [ ] All remaining systems negative except as per above.   [ ]Unable to obtain.  [x] No change in ROS since admission      Vital Signs Last 24 Hrs  T(C): 36.3 (21 Nov 2022 11:15), Max: 36.9 (20 Nov 2022 23:50)  T(F): 97.4 (21 Nov 2022 11:15), Max: 98.4 (20 Nov 2022 23:50)  HR: 91 (21 Nov 2022 11:15) (73 - 91)  BP: 142/80 (21 Nov 2022 11:15) (136/79 - 154/91)  BP(mean): --  RR: 19 (21 Nov 2022 11:15) (18 - 19)  SpO2: 99% (21 Nov 2022 11:15) (87% - 99%)    Parameters below as of 21 Nov 2022 11:15  Patient On (Oxygen Delivery Method): room air      I&O's Summary      PHYSICAL EXAM:  General: No acute distress BMI-  HEENT: EOMI, PERRL  Neck: Supple, [ ] JVD  Lungs: Equal air entry bilaterally; [ ] rales [ ] wheezing [ ] rhonchi  Heart: Regular rate and rhythm; [x ] murmur   2/6 [ x] systolic [ ] diastolic [ ] radiation[ ] rubs [ ]  gallops  Abdomen: Nontender, bowel sounds present  Extremities: No clubbing, cyanosis, [ ] edema [ ]Pulses  equal and intact  Nervous system:  Alert & Oriented X3, no focal deficits  Psychiatric: Normal affect  Skin: No rashes or lesions    LABS:  11-21    145  |  112<H>  |  19<H>  ----------------------------<  100<H>  4.1   |  29  |  1.19    Ca    8.7      21 Nov 2022 05:31  Phos  2.8     11-21  Mg     2.0     11-21    TPro  6.7  /  Alb  3.1<L>  /  TBili  0.4  /  DBili  x   /  AST  12  /  ALT  19  /  AlkPhos  67  11-21    Creatinine Trend: 1.19<--, 1.40<--, 1.14<--, 1.13<--, 1.46<--                        12.7   6.19  )-----------( 247      ( 21 Nov 2022 05:31 )             40.0

## 2022-11-21 NOTE — PROGRESS NOTE ADULT - PROBLEM SELECTOR PLAN 4
p/w left sided ear pain, hearing loss, vertiginous sx, with nausea and vomiting   -MRI shows left mastoid and middle ear effusion  -start Augmentin 875mg PO BID p/w left sided ear pain, hearing loss, vertiginous sx, with nausea and vomiting   -MRI shows left mastoid and middle ear effusion  -start Augmentin 875mg PO BID - continue for 8 days. End date 11/29

## 2022-12-16 ENCOUNTER — OFFICE (OUTPATIENT)
Dept: URBAN - METROPOLITAN AREA CLINIC 90 | Facility: CLINIC | Age: 62
Setting detail: OPHTHALMOLOGY
End: 2022-12-16
Payer: COMMERCIAL

## 2022-12-16 DIAGNOSIS — H01.001: ICD-10-CM

## 2022-12-16 DIAGNOSIS — H02.831: ICD-10-CM

## 2022-12-16 DIAGNOSIS — H02.834: ICD-10-CM

## 2022-12-16 DIAGNOSIS — H01.002: ICD-10-CM

## 2022-12-16 DIAGNOSIS — H02.403: ICD-10-CM

## 2022-12-16 DIAGNOSIS — H01.005: ICD-10-CM

## 2022-12-16 DIAGNOSIS — H25.13: ICD-10-CM

## 2022-12-16 DIAGNOSIS — H01.004: ICD-10-CM

## 2022-12-16 DIAGNOSIS — H02.835: ICD-10-CM

## 2022-12-16 DIAGNOSIS — H02.832: ICD-10-CM

## 2022-12-16 PROCEDURE — 92014 COMPRE OPH EXAM EST PT 1/>: CPT | Performed by: OPHTHALMOLOGY

## 2022-12-16 ASSESSMENT — VISUAL ACUITY
OS_BCVA: 20/50+2
OD_BCVA: 20/50+2

## 2022-12-16 ASSESSMENT — TONOMETRY
OD_IOP_MMHG: 16
OS_IOP_MMHG: 16

## 2022-12-16 ASSESSMENT — CONFRONTATIONAL VISUAL FIELD TEST (CVF)
OS_FINDINGS: FULL
OD_FINDINGS: FULL

## 2022-12-16 ASSESSMENT — REFRACTION_AUTOREFRACTION
OD_AXIS: 085
OS_SPHERE: +2.00
OS_AXIS: 087
OS_CYLINDER: -0.75
OD_CYLINDER: -0.75
OD_SPHERE: +2.25

## 2022-12-16 ASSESSMENT — AXIALLENGTH_DERIVED
OS_AL: 23.51
OD_AL: 23.3686

## 2022-12-16 ASSESSMENT — REFRACTION_CURRENTRX
OD_ADD: +2.50
OD_OVR_VA: 20/
OS_AXIS: 084
OS_SPHERE: +1.50
OS_CYLINDER: -0.50
OD_SPHERE: +1.25
OD_CYLINDER: SPH
OS_ADD: +2.50
OS_OVR_VA: 20/

## 2022-12-16 ASSESSMENT — KERATOMETRY
OS_K1POWER_DIOPTERS: 41.75
OS_K2POWER_DIOPTERS: 42.25
OS_AXISANGLE_DEGREES: 117
OD_K2POWER_DIOPTERS: 42.25
OD_AXISANGLE_DEGREES: 086
OD_K1POWER_DIOPTERS: 42.00

## 2022-12-16 ASSESSMENT — LID EXAM ASSESSMENTS
OD_BLEPHARITIS: RLL RUL 1+
OS_BLEPHARITIS: LLL LUL 1+

## 2022-12-16 ASSESSMENT — LID POSITION - PTOSIS
OD_PTOSIS: T
OS_PTOSIS: T

## 2022-12-16 ASSESSMENT — SPHEQUIV_DERIVED
OS_SPHEQUIV: 1.625
OD_SPHEQUIV: 1.875

## 2022-12-16 ASSESSMENT — LID POSITION - DERMATOCHALASIS
OS_DERMATOCHALASIS: 1+
OD_DERMATOCHALASIS: 1+

## 2023-12-27 NOTE — ASU PREOP CHECKLIST - NS PREOP CHK TEST_COVID_DT_GEN_ALL_CORE
Azelaic Acid Pregnancy And Lactation Text: This medication is considered safe during pregnancy and breast feeding. Topical Clindamycin Counseling: Patient counseled that this medication may cause skin irritation or allergic reactions.  In the event of skin irritation, the patient was advised to reduce the amount of the drug applied or use it less frequently.   The patient verbalized understanding of the proper use and possible adverse effects of clindamycin.  All of the patient's questions and concerns were addressed. Isotretinoin Pregnancy And Lactation Text: This medication is Pregnancy Category X and is considered extremely dangerous during pregnancy. It is unknown if it is excreted in breast milk. Spironolactone Counseling: Patient advised regarding risks of diarrhea, abdominal pain, hyperkalemia, birth defects (for female patients), liver toxicity and renal toxicity. The patient may need blood work to monitor liver and kidney function and potassium levels while on therapy. The patient verbalized understanding of the proper use and possible adverse effects of spironolactone.  All of the patient's questions and concerns were addressed. Dapsone Counseling: I discussed with the patient the risks of dapsone including but not limited to hemolytic anemia, agranulocytosis, rashes, methemoglobinemia, kidney failure, peripheral neuropathy, headaches, GI upset, and liver toxicity.  Patients who start dapsone require monitoring including baseline LFTs and weekly CBCs for the first month, then every month thereafter.  The patient verbalized understanding of the proper use and possible adverse effects of dapsone.  All of the patient's questions and concerns were addressed. Winlevi Pregnancy And Lactation Text: This medication is considered safe during pregnancy and breastfeeding. Detail Level: Zone Tazorac Counseling:  Patient advised that medication is irritating and drying.  Patient may need to apply sparingly and wash off after an hour before eventually leaving it on overnight.  The patient verbalized understanding of the proper use and possible adverse effects of tazorac.  All of the patient's questions and concerns were addressed. Use Enhanced Medication Counseling?: No Birth Control Pills Counseling: Birth Control Pill Counseling: I discussed with the patient the potential side effects of OCPs including but not limited to increased risk of stroke, heart attack, thrombophlebitis, deep venous thrombosis, hepatic adenomas, breast changes, GI upset, headaches, and depression.  The patient verbalized understanding of the proper use and possible adverse effects of OCPs. All of the patient's questions and concerns were addressed. Erythromycin Pregnancy And Lactation Text: This medication is Pregnancy Category B and is considered safe during pregnancy. It is also excreted in breast milk. Aklief Pregnancy And Lactation Text: It is unknown if this medication is safe to use during pregnancy.  It is unknown if this medication is excreted in breast milk.  Breastfeeding women should use the topical cream on the smallest area of the skin for the shortest time needed while breastfeeding.  Do not apply to nipple and areola. Sarecycline Counseling: Patient advised regarding possible photosensitivity and discoloration of the teeth, skin, lips, tongue and gums.  Patient instructed to avoid sunlight, if possible.  When exposed to sunlight, patients should wear protective clothing, sunglasses, and sunscreen.  The patient was instructed to call the office immediately if the following severe adverse effects occur:  hearing changes, easy bruising/bleeding, severe headache, or vision changes.  The patient verbalized understanding of the proper use and possible adverse effects of sarecycline.  All of the patient's questions and concerns were addressed. Tetracycline Pregnancy And Lactation Text: This medication is Pregnancy Category D and not consider safe during pregnancy. It is also excreted in breast milk. Minocycline Counseling: Patient advised regarding possible photosensitivity and discoloration of the teeth, skin, lips, tongue and gums.  Patient instructed to avoid sunlight, if possible.  When exposed to sunlight, patients should wear protective clothing, sunglasses, and sunscreen.  The patient was instructed to call the office immediately if the following severe adverse effects occur:  hearing changes, easy bruising/bleeding, severe headache, or vision changes.  The patient verbalized understanding of the proper use and possible adverse effects of minocycline.  All of the patient's questions and concerns were addressed. Topical Retinoid counseling:  Patient advised to apply a pea-sized amount only at bedtime and wait 30 minutes after washing their face before applying.  If too drying, patient may add a non-comedogenic moisturizer. The patient verbalized understanding of the proper use and possible adverse effects of retinoids.  All of the patient's questions and concerns were addressed. Topical Sulfur Applications Pregnancy And Lactation Text: This medication is Pregnancy Category C and has an unknown safety profile during pregnancy. It is unknown if this topical medication is excreted in breast milk. Bactrim Counseling:  I discussed with the patient the risks of sulfa antibiotics including but not limited to GI upset, allergic reaction, drug rash, diarrhea, dizziness, photosensitivity, and yeast infections.  Rarely, more serious reactions can occur including but not limited to aplastic anemia, agranulocytosis, methemoglobinemia, blood dyscrasias, liver or kidney failure, lung infiltrates or desquamative/blistering drug rashes. Doxycycline Pregnancy And Lactation Text: This medication is Pregnancy Category D and not consider safe during pregnancy. It is also excreted in breast milk but is considered safe for shorter treatment courses. Spironolactone Pregnancy And Lactation Text: This medication can cause feminization of the male fetus and should be avoided during pregnancy. The active metabolite is also found in breast milk. High Dose Vitamin A Counseling: Side effects reviewed, pt to contact office should one occur. Benzoyl Peroxide Counseling: Patient counseled that medicine may cause skin irritation and bleach clothing.  In the event of skin irritation, the patient was advised to reduce the amount of the drug applied or use it less frequently.   The patient verbalized understanding of the proper use and possible adverse effects of benzoyl peroxide.  All of the patient's questions and concerns were addressed. Topical Clindamycin Pregnancy And Lactation Text: This medication is Pregnancy Category B and is considered safe during pregnancy. It is unknown if it is excreted in breast milk. Azithromycin Counseling:  I discussed with the patient the risks of azithromycin including but not limited to GI upset, allergic reaction, drug rash, diarrhea, and yeast infections. Dapsone Pregnancy And Lactation Text: This medication is Pregnancy Category C and is not considered safe during pregnancy or breast feeding. Isotretinoin Counseling: Patient should get monthly blood tests, not donate blood, not drive at night if vision affected, not share medication, and not undergo elective surgery for 6 months after tx completed. Side effects reviewed, pt to contact office should one occur. Azelaic Acid Counseling: Patient counseled that medicine may cause skin irritation and to avoid applying near the eyes.  In the event of skin irritation, the patient was advised to reduce the amount of the drug applied or use it less frequently.   The patient verbalized understanding of the proper use and possible adverse effects of azelaic acid.  All of the patient's questions and concerns were addressed. Tazorac Pregnancy And Lactation Text: This medication is not safe during pregnancy. It is unknown if this medication is excreted in breast milk. Birth Control Pills Pregnancy And Lactation Text: This medication should be avoided if pregnant and for the first 30 days post-partum. Topical Retinoid Pregnancy And Lactation Text: This medication is Pregnancy Category C. It is unknown if this medication is excreted in breast milk. Winlevi Counseling:  I discussed with the patient the risks of topical clascoterone including but not limited to erythema, scaling, itching, and stinging. Patient voiced their understanding. Bactrim Pregnancy And Lactation Text: This medication is Pregnancy Category D and is known to cause fetal risk.  It is also excreted in breast milk. Erythromycin Counseling:  I discussed with the patient the risks of erythromycin including but not limited to GI upset, allergic reaction, drug rash, diarrhea, increase in liver enzymes, and yeast infections. Aklief counseling:  Patient advised to apply a pea-sized amount only at bedtime and wait 30 minutes after washing their face before applying.  If too drying, patient may add a non-comedogenic moisturizer.  The most commonly reported side effects including irritation, redness, scaling, dryness, stinging, burning, itching, and increased risk of sunburn.  The patient verbalized understanding of the proper use and possible adverse effects of retinoids.  All of the patient's questions and concerns were addressed. Topical Sulfur Applications Counseling: Topical Sulfur Counseling: Patient counseled that this medication may cause skin irritation or allergic reactions.  In the event of skin irritation, the patient was advised to reduce the amount of the drug applied or use it less frequently.   The patient verbalized understanding of the proper use and possible adverse effects of topical sulfur application.  All of the patient's questions and concerns were addressed. Benzoyl Peroxide Pregnancy And Lactation Text: This medication is Pregnancy Category C. It is unknown if benzoyl peroxide is excreted in breast milk. Tetracycline Counseling: Patient counseled regarding possible photosensitivity and increased risk for sunburn.  Patient instructed to avoid sunlight, if possible.  When exposed to sunlight, patients should wear protective clothing, sunglasses, and sunscreen.  The patient was instructed to call the office immediately if the following severe adverse effects occur:  hearing changes, easy bruising/bleeding, severe headache, or vision changes.  The patient verbalized understanding of the proper use and possible adverse effects of tetracycline.  All of the patient's questions and concerns were addressed. Patient understands to avoid pregnancy while on therapy due to potential birth defects. Azithromycin Pregnancy And Lactation Text: This medication is considered safe during pregnancy and is also secreted in breast milk. Doxycycline Counseling:  Patient counseled regarding possible photosensitivity and increased risk for sunburn.  Patient instructed to avoid sunlight, if possible.  When exposed to sunlight, patients should wear protective clothing, sunglasses, and sunscreen.  The patient was instructed to call the office immediately if the following severe adverse effects occur:  hearing changes, easy bruising/bleeding, severe headache, or vision changes.  The patient verbalized understanding of the proper use and possible adverse effects of doxycycline.  All of the patient's questions and concerns were addressed. High Dose Vitamin A Pregnancy And Lactation Text: High dose vitamin A therapy is contraindicated during pregnancy and breast feeding. 14-Nov-2022

## (undated) DEVICE — GLV 8 PROTEXIS (CREAM) MICRO

## (undated) DEVICE — SOL INJ NS 0.9% 500ML 1-PORT

## (undated) DEVICE — SUT QUILL MONODERM 3-0 30CM 26MM

## (undated) DEVICE — DRAPE LIGHT HANDLE COVER (BLUE)

## (undated) DEVICE — DRAPE TOWEL BLUE 17" X 24"

## (undated) DEVICE — TAPE SILK 3"

## (undated) DEVICE — GOWN XXL

## (undated) DEVICE — POSITIONER STIRRUP STRAP W SLIP RING 19X3.5"

## (undated) DEVICE — STRYKER MIXEVAC 3 BONE CEMENT MIXER

## (undated) DEVICE — WARMING BLANKET UPPER ADULT

## (undated) DEVICE — SYR LUER LOK 20CC

## (undated) DEVICE — NDL HYPO SAFE 22G X 1.5" (BLACK)

## (undated) DEVICE — SAW BLADE STRYKER SAGITTAL DUAL CUT 18MMX90MMX1.27MM

## (undated) DEVICE — SOL IRR POUR H2O 1500ML

## (undated) DEVICE — TOURNIQUET ESMARK 6"

## (undated) DEVICE — SUT POLYSORB 1 18" UNDYED

## (undated) DEVICE — ELCTR AQUAMANTYS BIPOLAR SEALER 6.0

## (undated) DEVICE — SOL IRR BAG NS 0.9% 3000ML

## (undated) DEVICE — SUT POLYSORB 2-0 30" GS-10 UNDYED

## (undated) DEVICE — GLV 8.5 PROTEXIS (BLUE)

## (undated) DEVICE — DRSG AQUACEL 3.5 X 10"

## (undated) DEVICE — DRAPE IOBAN 33" X 23"

## (undated) DEVICE — TOURNIQUET CUFF 34" DUAL PORT W PLC

## (undated) DEVICE — MARKING PEN W RULER

## (undated) DEVICE — VENODYNE/SCD SLEEVE CALF MEDIUM

## (undated) DEVICE — Device

## (undated) DEVICE — ELCTR GROUNDING PAD ADULT COVIDIEN

## (undated) DEVICE — SOL INJ NS 0.9% 100ML

## (undated) DEVICE — HOOD FLYTE STRYKER HELMET SHIELD

## (undated) DEVICE — MARKING PEN W RULER / LABELS

## (undated) DEVICE — FOLEY TRAY 16FR SURESTEP LTX BG

## (undated) DEVICE — DRSG DERMABOND PRINEO 60CM